# Patient Record
Sex: FEMALE | Race: WHITE | NOT HISPANIC OR LATINO
[De-identification: names, ages, dates, MRNs, and addresses within clinical notes are randomized per-mention and may not be internally consistent; named-entity substitution may affect disease eponyms.]

---

## 2022-11-17 ENCOUNTER — TRANSCRIBE ORDERS (OUTPATIENT)
Dept: OTHER | Age: 62
End: 2022-11-17

## 2022-11-17 DIAGNOSIS — L30.9 HAND DERMATITIS: Primary | ICD-10-CM

## 2023-03-23 NOTE — TELEPHONE ENCOUNTER
FUTURE VISIT INFORMATION      FUTURE VISIT INFORMATION:    Date: 6.13.23    Time: 12:15    Location: [facility]  REFERRAL INFORMATION:    Referring provider:  Liv    Referring providers clinic:  [facility]    Reason for visit/diagnosis  Hand dermatitis [L30.9] / recs in [facility] / ref by Alex Peck MD / dedrick PtVicky Denise    RECORDS REQUESTED FROM:       Clinic name Comments Records Status Imaging Status   [facility] 11.15.23  Liv ERNANDEZ

## 2023-06-13 ENCOUNTER — PRE VISIT (OUTPATIENT)
Dept: ALLERGY | Facility: CLINIC | Age: 63
End: 2023-06-13

## 2023-06-13 ENCOUNTER — OFFICE VISIT (OUTPATIENT)
Dept: ALLERGY | Facility: CLINIC | Age: 63
End: 2023-06-13
Attending: ALLERGY & IMMUNOLOGY
Payer: COMMERCIAL

## 2023-06-13 DIAGNOSIS — L23.89 ALLERGIC CONTACT DERMATITIS DUE TO OTHER AGENTS: ICD-10-CM

## 2023-06-13 DIAGNOSIS — L30.1 DYSHIDROTIC HAND DERMATITIS: ICD-10-CM

## 2023-06-13 DIAGNOSIS — J30.89 SEASONAL AND PERENNIAL ALLERGIC RHINOCONJUNCTIVITIS: ICD-10-CM

## 2023-06-13 DIAGNOSIS — B35.2 TINEA MANUUM: Primary | ICD-10-CM

## 2023-06-13 DIAGNOSIS — H10.10 SEASONAL AND PERENNIAL ALLERGIC RHINOCONJUNCTIVITIS: ICD-10-CM

## 2023-06-13 DIAGNOSIS — L20.89 OTHER ATOPIC DERMATITIS: ICD-10-CM

## 2023-06-13 DIAGNOSIS — L30.9 HAND DERMATITIS: ICD-10-CM

## 2023-06-13 DIAGNOSIS — J30.2 SEASONAL AND PERENNIAL ALLERGIC RHINOCONJUNCTIVITIS: ICD-10-CM

## 2023-06-13 PROCEDURE — 87101 SKIN FUNGI CULTURE: CPT | Performed by: DERMATOLOGY

## 2023-06-13 PROCEDURE — 99203 OFFICE O/P NEW LOW 30 MIN: CPT | Performed by: DERMATOLOGY

## 2023-06-13 RX ORDER — MULTIVIT WITH MINERALS/LUTEIN
1000 TABLET ORAL DAILY
COMMUNITY

## 2023-06-13 RX ORDER — ALBUTEROL SULFATE 90 UG/1
2 AEROSOL, METERED RESPIRATORY (INHALATION)
COMMUNITY
Start: 2021-08-26

## 2023-06-13 RX ORDER — CHLORAL HYDRATE 500 MG
2000 CAPSULE ORAL
COMMUNITY
Start: 2021-08-24

## 2023-06-13 RX ORDER — PIMECROLIMUS 10 MG/G
CREAM TOPICAL
COMMUNITY
Start: 2022-11-18

## 2023-06-13 RX ORDER — FLUTICASONE PROPIONATE 50 MCG
1 SPRAY, SUSPENSION (ML) NASAL PRN
COMMUNITY

## 2023-06-13 RX ORDER — LEVOTHYROXINE SODIUM 125 UG/1
125 TABLET ORAL
COMMUNITY
Start: 2022-12-07

## 2023-06-13 RX ORDER — MULTIPLE VITAMINS W/ MINERALS TAB 9MG-400MCG
1 TAB ORAL DAILY
COMMUNITY

## 2023-06-13 NOTE — PATIENT INSTRUCTIONS
_____________________________    PATCH TESTING    WHAT IS A PATCH TEST ?    A patch test is a way of identifying whether a substance has caused a delayed reaction with skin inflammation, such as contact eczema or delayed (after days) reactions to drugs. We will use various different types of test compounds, which may include common allergens in occupation and daily life such as  preservatives, fragrances or even drugs. Most of the time we will use standardized, prefabricated test solutions and the choice of the substances and series tested will depend on the history of the allergic reactions. Sometimes we will test your own products you are exposed at home or at work. In order to avoid severe toxic reactions we need detailed information s or safety sheets about each of these test compounds.    HOW IS A PATCH TEST PERFORMED ?    You will be given three appointments to complete this test. On the first appointment the nurse will apply 100-180 small test chambers each one of them containing a different allergen on your back and/or on your arms. We will use hypoallergenic and somehow waterproof adhesive tape. Afterwards the different sites will be marked with a waterproof marker. These patches must stay in place for 2 days. On the second appointment the patches will be removed and the allergy doctor/nurse will evaluate the skin reactions and maybe re-apply the marks. On the third appointment the allergy doctor will re-evaluate possible allergic reactions and discuss with you the nature of the allergens you react to and how to avoid them.    AVOID THE FOLLOWING:    DO NOT wash your back and other test areas during the entire test period (3-5 days), NO bathing or swimming  AVOID strenuous exercise with sweating  DO NOT scratch the test area  AVOID exposure to UV irradiation (sun, therapy)    Patch tests should be performed when the allergy is resolved  Remove patch tests only if severe reaction (itch, pain, blistering)  and report to your doctor immediately. Luis Fernando then the locations of each test field  If patch tests peel off, then try to fix them and record time and luis fernando test field  No oral steroids (e.g. Prednisone) 1 month prior to tests    WHAT ARE THE POSSIBLE RISKS OF PATCH TESTS ?    If you are allergic to a compound tested you will develop after a few days localized skin reactions similar to your previous allergic reaction. This includes formation of red, itchy skin lesions of few mm to cm with small vesicles and even blisters. The lesions will fade off over days and weeks and might sometimes leave for a few months some skin pigmentations. In rare cases a localized reaction to patch tests can become generalized. The tests with your own products might have some risks because they are not standardized and unanticipated reactions can occur. We need as much information as possible to evaluate if your own products are safe to test and under what conditions. This has to be evaluated for each individual product.        ? WHAT ARE THE PREPARATIONS NEEDED FOR THESE VARIOUS ALLERGY TESTS ?    Some medications can affect the reactions to allergens during the tests. Therefore reveal all the medications you take to the allergy team and the doctor will discuss with you the medications before/during the tests. Normally, you have to respect following rules (unless otherwise instructed by doctor):  For prick, intradermal and provocation tests stop antihistamines (e.g. loratadine (Claritin), cetirizine (Zyrtec), fexofenadine (Allegra) etc 1 week prior to the appointment   For patch tests and provocation tests, stop systemic corticosteroids 1 month and topical steroids 1 week prior to tests  Eat normally and take a shower before tests    _____________________________    SKIN PRICK TESTING    WHAT IS A SKIN PRICK TEST (SPT) ?    A skin prick test (SPT) is a simple and reliable diagnostic test used to identify substances ( allergens )  responsible for triggering the symptoms of allergy. The basic SPT panel includes common allergens, such as house dust mites, molds, dog and cat allergens and grass/tree pollen allergens. We have other more specialized series for various food allergens and sometimes we test your own food directly on your skin. Tests will be usually performed on the skin of the forearm (rarely on back). The skin may develop a red and itchy reaction which can be an indication of an allergy to to tested substance, but will be confirmed by discussion with the allergy specialist    HOW IS A SPT PERFORMED ?    The skin will be disinfected with 70% Isopropanol alcohol, then marked and numbered with a pen to identify the areas where the specific allergens will be tested. Afterwards a drop of the allergen solution will be placed on the skin and then the skin gently pricked using the tip of a specially designed prick needle. With this procedure the most superficial part of the skin will be pierced to allow the test solution to diffuse into the skin and make contact with the reactive immune cells. After 15-30min the area will be examined and evaluated for possible allergic reactions.        WHAT ARE THE POSSIBLE RISKS OF SPT ?    If the skin reacts it will develop red, itchy wheals of 5-30mm diameter. The wheal and itch will usually disappear spontaneously after 1-2 hours. Sometimes there might be a delayed reactions after days and this has to be reported to the treating allergy specialist (could be another kind of reaction pattern and important piece of information). Rarely there are more serious generalized allergic reactions observed and therefore you will be under observation of the allergy team during the entire procedure. There is a small risk for some bleeding and skin infection by the SPT.    _____________________________    INTRADERMAL TESTS      WHAT IS AN INTRADERMAL TEST (IDT) ?    An intradermal test (IDT) is basically a  continuation of the SPT and is sometimes proposed if the skin prick test is negative and the person is strongly suspected to have an allergy to a particular substance. IDT is particularly used for diagnosis of drug allergies and only sterile solutions will be tested by IDT. Because more allergen is delivered to the skin than in SPT the IDT can add more sensitivity to the allergy tests, but with a higher potential risk.     HOW IS AN INTRADERMAL TEST (IDT) PERFORMED ?    A small amount (~ 0.05ml) of the suspected allergen is injected with a very fine insulin needle just beneath the skin. The injections are made at spaced intervals after disinfection and marking of the skin areas. The tests are usually performed on the forearm (rarely back). The initial test will be started with a very diluted solution and if the results are negatives the procedure might be repeated with serial dilutions of higher concentrations. Therefore, the estimated duration of this test is about 45-60 min. Sometimes we observe delayed type reactions to the intradermal tests after 1-4 days and if this is the case take a photo and inform our staff and load the photo into [employer]. Best would be to take the photos with a ruler that we know at which position the positive test was.          WHAT ARE THE POSSIBLE RISKS OF IDT ?    If the skin reacts it will develop red, itchy wheals of 5-30mm diameter. The wheal and itch will usually disappear spontaneously after 1-2 hours. Sometimes there might be a delayed reactions after days and this has to be reported to the treating allergy specialist (could be another kind of reaction pattern and important piece of information). Rarely there are more serious generalized allergic reactions observed and therefore you will be under observation of the allergy team during the entire procedure. Only sterile solutions will be used for injections, but there is still a small risk for infections or unpredictable local  toxic reactions by the allergens. Some transient bleeding might occur.     _____________________________      ORAL PROVOCATION TEST      WHAT IS AN  ORAL PROVOCATION TEST (OPT) ?    An oral provocation test (OPT) is a procedure primarily used to exclude a specific allergy to a particular drug or food. These substances are then administered orally, rarely in case of drugs by subcutaneous or intravenous injections. This test is only conducted if the skin prick and intradermal and/or patch tests were negatives. A formal written consent will be taken prior to the provocation test.         HOW IS AN ORAL PROVOCATION TEST PERFORMED?    For oral (food/drugs) provocation tests you will have to ingest the food or drug hidden in a capsule or in its natural form. The test will usually be placebo-controlled and will include a capsule or other application form not containing the suspected allergen, but non-reactive Mannitol sugar. The various drugs/food will be given at specific time intervals under strict medical supervision.     Two to six serial doses containing the test food or drug will given at normally 30min time intervals, which might vary for each individual. You will be required to stay at the clinic at all times so that the allergy doctors and nurses can early recognize and treat immediately possible allergic reactions. After the last dose you have to stay during at least 1h for observation. The entire procedure will take about 2-6hours, depending on the number of incremental doses and the observation time.     WHAT ARE THE POSSIBLE RISKS OF ORAL PROVOCATION TEST ?    The provocation tests have the highest risk potential of all the tests and therefore close observation is mandatory. The entire procedure will be discussed prior to the test (except when the placebo will be given). The reactions can go from local allergic reactions to more severe generalized reactions. Therefore, we will not perform provocation tests  without prior evaluation by prick or intradermal tests and we plan to exclude an allergy by this test. If there is a higher risk, the test will be performed in a bed and with a secure intravenous access with infusion. The medication of a severe allergic reactions include high dose corticosteroids, antihistamines and if necessary epinephrine (Epi-Pen).    Useful Contact Information    Change of appointments or allergy-related [phone]  Email: [email or web address]  Location/address: [address] [place] [id number]    If you develop any serious or adverse allergic reaction after office hours please seek immediate medical assistance at the nearest clinic or emergency room.

## 2023-06-13 NOTE — PROGRESS NOTES
[facility] Dermato-allergology Note  Office visit  Encounter Date: Jun 13, 2023  ____________________________________________    CC: No chief complaint on file.      HPI:  (Jun 13, 2023)  Ms. Vicky Carlson is a(n) 62 year old female who presents today as new patient for allergy consultation  - Had in 2014 allergy testing with Dr. Peck and found pollen allergies then.   - since 2017 on back of hands first and then also palmar recurrent dermatitis left hand >> right hand  - one time had eczema below the left eye, otherwise other body areas not affected  >> Betamethasone ointment seems to make it worse  >> Elidel helped for some time  - otherwise feeling well in usual state of health    Physical exam:  General: In no acute distress, well-developed, well-nourished  Eyes: no conjunctivitis  ENT: no signs of rhinitis   Pulmonary: no wheezing or coughing  Skin: see above    Past Medical History:   There is no problem list on file for this patient.    No past medical history on file.    Allergies:  Not on File    Medications:  No current outpatient medications on file.     No current facility-administered medications for this visit.       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 Patient has the following hobbies or non-occupational exposure: golfing, paper crafts  Doesn't wear gloves really    Family History:  No family history on file.    Previous Labs, Allergy Tests, Dermatopathology, Imaging:  none    Referred By: Alex Peck MD  [address] [place] [address]     Allergy Tests:    Past Allergy Test    Order for Future Allergy Testing:    [] Outpatient  [] Inpatient: [facility]..../ Bed ....       Skin Atopy (atopic dermatitis) [] Yes   [x] No ..as child on feet dermatitis  Contact allergies:   [] Yes   [x] No ..........  Hand eczema:   [x] Yes   [] No recurrent dermatitis on back of hands and palmar left>right           Leading hand:   [x] R   [] L       []  Ambidextrous         Drug allergies:        [] Yes   [x] No  which?......    Urticaria/Angioedema  [] Yes   [] No .........  Food Allergy:  [x] Yes   [] No  Which?.if eats a lot of almonds get itchy in the mouth and soarness, shellfish like crab got stuffed in nose and mouth (shrimp/oyster no problems)   Pets :  [] Yes   [x] No  Which?..reacts to dogs and cats.         [x]  Rhinitis   [x] Conjunctivitis   [x] Sinusitis   [] Polyposis   [] Otitis   [] Pharyngitis         []  Postnasal drip    []  none  Operations:   [] Tonsils   [] Septum   [] Sinus   [] Polyposis        [] Asthma bronchiale   [] Coughing      [x]  none  Symptoms (mostly Rhinoconjunctivitis and Asthma) aggravated by:  Season   [] I   [] II   [x] III   [x] IV   []V   []VI   []VII   [x]VIII   [x]IX   []X   []XI   []XII     [] perennial   Day time      [] morning   [] noon      [] evening        [] night    [] whole day........  []  none  Location/changes    [] inside        [] outside   [] mountains    [] sea     [] others.............   []  none  Triggers, specific     [] animals     [] plants     [] dust              [] others ...........................    []  none  Triggers, others       [] work          [] psyche    [] sport            [] others .............................  []  none  Irritant                [] phys efforts [] smoke    [] heat/cold     [] odors  []others............... []  none    Order for PATCH TESTS  Reason for tests (suspected allergy): recurrent hand dermatitis  Known previous allergies: none  Standardized panels  [x] Standard panel (40 tests)  [x] Preservatives & Antimicrobials (31 tests)  [x] Emulsifiers & Additives (25 tests)   [x] Perfumes/Flavours & Plants (25 tests)  [x] Hairdresser panel (12 tests)  [] Rubber Chemicals (22 tests)  [x] Plastics (26 tests)  [] Colorants/Dyes/Food additives (20 tests)  [] Metals (implants/dental) (24 tests)  [] Local anaesthetics/NSAIDs (13 tests)  [x] Antibiotics & Antimycotics (14  tests)   [x] Corticosteroids (15 tests)   [] Photopatch test (62 tests)   [] others: ...      [x] Patient's own products: Betamethasone    DO NOT test if chemical or biological identity is unknown!     always ask from patient the product information and safety sheets (MSDS)       Order for PRICK TESTS    Reason for tests (suspected allergy): not necessary  Known previous allergies: seasonal RC tested with Dr. Peck    Standardized prick panels  [x] Atopic panel (20 tests) maybe only first 8 for pricks and if neg do IDT with delayed readings  [] Pediatric Panel (12 tests)  [] Milk, Meat, Eggs, Grains (20 tests)   [] Dust, Epithelia, Feathers (10 tests)  [x] Fish, Seafood, Shellfish (17 tests)  [] Nuts, Beans (14 tests)  [] Spice, Vegetable, Fruit (17 tests)  [] Pollen Panel = Tree, Grass, Weed (24 tests)  [] Others: ...      [] Patient's own products: ...    DO NOT test if chemical or biological identity is unknown!     always ask from patient the product information and safety sheets (MSDS)     Standardized intradermal tests  [] Penicillium notatum [] Aspergillus fumigatus [] House dust mites D.far & D. pteron  [] Cat    [] dog  [] Others: ...  [] Bee venom   [] Wasp venom  !!Specific protocol with dilutions!!       Order for Drug allergy tests (prick & Intradermal & patch tests)    [] Penicillin G  [] Ampicillin [] Cefazolin   [] Ceftriaxone   [] Ceftazidime  [x] Bactrim    [] Others: ...  Order for ... as test date    [] Patient needs consultation with Allergy team (changes of tests may apply)  [x] Tests discussed with Allergy team (can have direct appointment for allergy tests)     ________________________________    Assessment & Plan:    ==> Final Diagnosis:     # recurrent dermatitis on hand left>right  DDx atopic dermatitis with dyshidrosis and irritant dermatitis  DDx allergic contact dermatitis  DDx tinea hands  * chronic illness with exacerbation, progression, side effects from treatment    # atopic  predisposition with    Seasonal RC (proven by Dr. Peck)    Dyshidrotic hand dermatitis  * chronic illness with exacerbation, progression, side effects from treatment      These conclusions are made at the best of one's knowledge and belief based on the provided evidence such as patient's history and allergy test results and they can change over time or can be incomplete because of missing information's.    ==> Treatment Plan:  >> fungal culture today and if filamentous fungi, then treat orally with Lamisil  >> plan patch and partial prick tests for molds and dust mites and maybe IDT    Staff:  Provider    Follow-up in Derm-Allergy clinic     I spent a total of 35 minutes with Vicky Carlson. This time was spent counseling the patient and/or coordinating care, explaining the allergy tests

## 2023-06-13 NOTE — Clinical Note
6/13/2023         RE: Vicky Carlson  [address] [place] [address]        Dear Colleague,    Thank you for referring your patient, Vicky Carlson, to the [facility] [place]. Please see a copy of my visit note below.    Formerly [facility] Dermato-allergology Note  Office visit  Encounter Date: Jun 13, 2023  ____________________________________________    CC: No chief complaint on file.      HPI:  (Jun 13, 2023)  Ms. Vicky Carlson is a(n) 62 year old female who presents today as new patient for allergy consultation  - Had in 2014 allergy testing with Dr. Peck and found pollen allergies then.   - since 2017 on back of hands first and then also palmar recurrent dermatitis left hand >> right hand  - one time had eczema below the left eye, otherwise other body areas not affected  >> Betamethasone ointment seems to make it worse  >> Elidel helped for some time  - otherwise feeling well in usual state of health    Physical exam:  General: In no acute distress, well-developed, well-nourished  Eyes: no conjunctivitis  ENT: no signs of rhinitis   Pulmonary: no wheezing or coughing  Skin: Focused examination of the skin on test sites was performed = see test results below  {Skin Exam:941481}    Past Medical History:   There is no problem list on file for this patient.    No past medical history on file.    Allergies:  Not on File    Medications:  No current outpatient medications on file.     No current facility-administered medications for this visit.       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 Patient has the following hobbies or non-occupational exposure: golfing, paper crafts  Doesn't wear gloves really    Family History:  No family history on file.    Previous Labs, Allergy Tests, Dermatopathology, Imaging:  none    Referred By: Alex Peck MD  [address] [address]     Allergy Tests:    Past Allergy Test    Order  for Future Allergy Testing:    [] Outpatient  [] Inpatient: [facility]..../ Bed ....       Skin Atopy (atopic dermatitis) [] Yes   [x] No ..as child on feet dermatitis  Contact allergies:   [] Yes   [x] No ..........  Hand eczema:   [x] Yes   [] No recurrent dermatitis on back of hands and palmar left>right           Leading hand:   [x] R   [] L       [] Ambidextrous         Drug allergies:        [] Yes   [x] No  which?......    Urticaria/Angioedema  [] Yes   [] No .........  Food Allergy:  [x] Yes   [] No  Which?.if eats a lot of almonds get itchy in the mouth and soarness, shellfish like crab got stuffed in nose and mouth (shrimp/oyster no problems)   Pets :  [] Yes   [x] No  Which?..reacts to dogs and cats.         [x]  Rhinitis   [x] Conjunctivitis   [x] Sinusitis   [] Polyposis   [] Otitis   [] Pharyngitis         []  Postnasal drip    []  none  Operations:   [] Tonsils   [] Septum   [] Sinus   [] Polyposis        [] Asthma bronchiale   [] Coughing      [x]  none  Symptoms (mostly Rhinoconjunctivitis and Asthma) aggravated by:  Season   [] I   [] II   [x] III   [x] IV   []V   []VI   []VII   [x]VIII   [x]IX   []X   []XI   []XII     [] perennial   Day time      [] morning   [] noon      [] evening        [] night    [] whole day........  []  none  Location/changes    [] inside        [] outside   [] mountains    [] sea     [] others.............   []  none  Triggers, specific     [] animals     [] plants     [] dust              [] others ...........................    []  none  Triggers, others       [] work          [] psyche    [] sport            [] others .............................  []  none  Irritant                [] phys efforts [] smoke    [] heat/cold     [] odors  []others............... []  none    Order for PATCH TESTS  Reason for tests (suspected allergy): recurrent hand dermatitis  Known previous allergies: none  Standardized panels  [x] Standard panel (40 tests)  [x] Preservatives & Antimicrobials  (31 tests)  [x] Emulsifiers & Additives (25 tests)   [x] Perfumes/Flavours & Plants (25 tests)  [x] Hairdresser panel (12 tests)  [] Rubber Chemicals (22 tests)  [x] Plastics (26 tests)  [] Colorants/Dyes/Food additives (20 tests)  [] Metals (implants/dental) (24 tests)  [] Local anaesthetics/NSAIDs (13 tests)  [x] Antibiotics & Antimycotics (14 tests)   [x] Corticosteroids (15 tests)   [] Photopatch test (62 tests)   [] others: ...      [x] Patient's own products: Betamethasone    DO NOT test if chemical or biological identity is unknown!     always ask from patient the product information and safety sheets (MSDS)       Order for PRICK TESTS    Reason for tests (suspected allergy): not necessary  Known previous allergies: seasonal RC tested with Dr. Peck    Standardized prick panels  [x] Atopic panel (20 tests) maybe only first 8 for pricks and if neg do IDT with delayed readings  [] Pediatric Panel (12 tests)  [] Milk, Meat, Eggs, Grains (20 tests)   [] Dust, Epithelia, Feathers (10 tests)  [x] Fish, Seafood, Shellfish (17 tests)  [] Nuts, Beans (14 tests)  [] Spice, Vegetable, Fruit (17 tests)  [] Pollen Panel = Tree, Grass, Weed (24 tests)  [] Others: ...      [] Patient's own products: ...    DO NOT test if chemical or biological identity is unknown!     always ask from patient the product information and safety sheets (MSDS)     Standardized intradermal tests  [] Penicillium notatum [] Aspergillus fumigatus [] House dust mites D.far & D. pteron  [] Cat    [] dog  [] Others: ...  [] Bee venom   [] Wasp venom  !!Specific protocol with dilutions!!       Order for Drug allergy tests (prick & Intradermal & patch tests)    [] Penicillin G  [] Ampicillin [] Cefazolin   [] Ceftriaxone   [] Ceftazidime  [x] Bactrim    [] Others: ...  Order for ... as test date    [] Patient needs consultation with Allergy team (changes of tests may apply)  [x] Tests discussed with Allergy team (can have direct appointment for allergy  tests)     ________________________________    Assessment & Plan:    ==> Final Diagnosis:     # recurrent dermatitis on hand left>right  DDx atopic dermatitis with dyshidrosis and irritant dermatitis  DDx allergic contact dermatitis  DDx tinea hands  {jgstatus:293292}    # atopic predisposition with    Seasonal RC (proven by Dr. Peck)    Dyshidrotic hand dermatitis  {jgstatus:341286}    # ***  {jgstatus:734649}  {jgallergytestfinal:527109}  - ***    These conclusions are made at the best of one's knowledge and belief based on the provided evidence such as patient's history and allergy test results and they can change over time or can be incomplete because of missing information's.    ==> Treatment Plan:  >> fungal culture today and if filamentous fungi, then treat orally with Lamisil  >> plan patch and partial prick tests for molds and dust mites and maybe IDT    Staff:  Provider    Follow-up in Derm-Allergy clinic     I spent a total of 30 minutes with Vicky Carlson. This time was spent counseling the patient and/or coordinating care, explaining the allergy tests      Again, thank you for allowing me to participate in the care of your patient.        Sincerely,        Jeff Wild MD

## 2023-06-28 ENCOUNTER — TELEPHONE (OUTPATIENT)
Dept: ALLERGY | Facility: CLINIC | Age: 63
End: 2023-06-28
Payer: COMMERCIAL

## 2023-06-28 NOTE — TELEPHONE ENCOUNTER
[facility] Call Center    Phone Message    May a detailed message be left on voicemail: yes     Reason for Call: Other: Patient called to say she had xrays taken and a mass was found on her lung. She may need a PET scan and a procedure to go in to take a biopsy. She is unsure what type of meds she will be put on. Should she reschedule her patch test or should she wait to see if they put her on any steroids, etc? Please call back [phone] Thank you     Action Taken: Message routed to:  [facility] ([facility]): Allergy    Travel Screening: Not Applicable

## 2023-06-29 NOTE — CONFIDENTIAL NOTE
Called to discuss. Keeping patch testing as scheduled. Patient states she will update clinic if anything changes, has to take steroids etc.     Patient also asked about fungal culture results, will route to provider regarding results.

## 2023-07-11 LAB — BACTERIA SKIN AEROBE CULT: NO GROWTH

## 2023-07-13 ENCOUNTER — TELEPHONE (OUTPATIENT)
Dept: ALLERGY | Facility: CLINIC | Age: 63
End: 2023-07-13
Payer: COMMERCIAL

## 2023-07-13 DIAGNOSIS — Z88.9 DRUG ALLERGY: Primary | ICD-10-CM

## 2023-07-13 NOTE — TELEPHONE ENCOUNTER
Standardized panels  [x]? Standard panel (40 tests)  [x]? Preservatives & Antimicrobials (31 tests)  [x]? Emulsifiers & Additives (25 tests)   [x]? Perfumes/Flavours & Plants (25 tests)  [x]? Hairdresser panel (12 tests)  []? Rubber Chemicals (22 tests)  [x]? Plastics (26 tests)  []? Colorants/Dyes/Food additives (20 tests)  []? Metals (implants/dental) (24 tests)  []? Local anaesthetics/NSAIDs (13 tests)  [x]? Antibiotics & Antimycotics (14 tests)   [x]? Corticosteroids (15 tests)   []? Photopatch test (62 tests)   []? others: ...                         [x]? Patient's own products: Betamethasone    STANDARD Series                                          # Substance 2 days 4 days remarks     1 Cesar Mix [C] - -       2 Colophony - -       3  2-Mercaptobenzothiazole  - -       4 Methylisothiazolinone - -       5 Carba Mix - -       6 Thiuram Mix [A] - -       7 Bisphenol A Epoxy Resin - -       8 V-Cfjg-[place]-Formaldehyde Resin - -       9 Mercapto Mix [A] - -       10 Black Rubber Mix- PPD [B] - -       11 Potassium Dichromate  -  -       12 Balsam of [place] (Myroxylon Pereirae Resin) - -       13 Nickel Sulphate Hexahydrate - -       14 Mixed Dialkyl Thiourea - -       15 Paraben Mix [B] - -       16 Methyldibromo Glutaronitrile - -       17 Fragrance Mix 8% - -       18 2-Bromo-2-Nitropropane-1,3-Diol (Bronopol) CT - -       19 Lyral - -       20 Tixocortol-21- Pivalate CT - -       21 Diazolidinyl urea (Germall II) - -        22 Methyl Methacrylate - -       23 Cobalt (II) Chloride Hexahydrate - -       24 Fragrance Mix II  - -       25 Compositae Mix - -       26 Benzoyl Peroxide - -       27 Bacitracin - -       28 Formaldehyde - -       29 Methylchloroisothiazolinone / Methylisothiazolinone - -       30 Corticosteroid Mix CT - -       31 Sodium Lauryl Sulfate - -       32 Lanolin Alcohol - -       33 Turpentine - -       34 Cetylstearylalcohol - -       35 Chlorhexidine Dicluconate - -       36  Budesonide - -       37 Imidazolidinyl Urea  - -       38 Ethyl-2 Cyanoacrylate - -       39 Quaternium 15 (Dowicil 200) - -       40 Decyl Glucoside - -       PRESERVATIVES & ANTIMICROBIALS        # Substance 2 days 4 days remarks   41 1  1,2-Benzisothiazoline-3-One, Sodium Salt - -     2  1,3,5-Ryder (2-Hydroxyethyl) - Hexahydrotriazine (Grotan BK) - -     3 1-Nqvfqoeprrymc-1-Nitro-1, 3-Propanediol - -     4  3, 4, 4' - Triclocarban - -    45 5 4 - Chloro - 3 - Cresol - -     6 4 - Chloro - 4 - Xylenol (PCMX) - -     7 7-Ethylbicyclooxazolidine (Bioban CK5717) - -     8 Benzalkonium Chloride CT - -     9 Benzyl Alcohol - -    50 10 Cetalkonium Chloride - -     11 Cetylpyrimidine Chloride  - -     12 Chloroacetamide - -     13 DMDM Hydantoin - -     14 Glutaraldehyde - -    55 15 Triclosan - -     16 Glyoxal Trimeric Dihydrate - -     17 Iodopropynyl Butylcarbamate - -     18 Octylisothiazoline - -     19 Bithionol CT - -    60 20 Bioban P 1487 (Nitrobutyl) Morpholine/(Ethylnitro-Trimethylene) Dimorpholine - -     21 Phenoxyethanol - -     22 Phenyl Salicylate - -     23 Povidone Iodine - -     24 Sodium Benzoate - -    65 25 Sodium Disulfite - -     26 Sorbic Acid - -     27 Thimerosal - -     28 Melamine Formaldehyde Resin - -     29 Ethylenediamine Dihydrochloride - -      Parabens      70 30 Butyl-P-Hydroxybenzoate - -     31 Ethyl-P-Hydroxybenzoate - -     32 Methyl-P-Hydroxybenzoate - -    73 33 Propyl-P-Hydroxybenzoate - -      EMULSIFIERS & ADDITIVES       # Substance 2 days 4 days remarks   74 1 Polyethylene Glycol-400 - -    75 2 Cocamidopropyl Betaine - -     3 Amerchol L101 - -     4 Propylene Glycol - -     5 Triethanolamine - -     6 Sorbitane Sesquiolate CT - -    80 7 Isopropylmyristate - -     8 Polysorbate 80 CT - -     9 Amidoamine   (Stearamidopropyl Dimethylamine) - -     10 Oleamidopropyl Dimethylamine - -     11 Lauryl Glucoside - -    85 12 Coconut Diethanolamide  - -     13  2-Hydroxy-4-Methoxy Benzophenone (Oxybenzone) - -     14 Benzophenone-4 (Sulisobenzon) - -     15 Propolis - -     16 Dexpanthenol - -    90 17 Abitol - -     18 Tert-Butylhydroquinone - -     19 Benzyl Salicylate - -     20 Dimethylaminopropylamin (DMPA) - -     21 Zinc Pyrithione (Zinc Omadine)  - -    95 22 Ryder(Hydroxymethyl) Nitromethane  - -      Antioxidant       23 Dodecyl Gallate - -     24 Butylhydroxyanisole (BHA) - -     25 Butylhydroxytoluene (BHT) - -     26 Di-Alpha-Tocopherol (Vit E) - -    100 27 Propyl Gallate - -      PERFUMES, FLAVORS & PLANTS        # Substance 2 days 4 days remarks   101 1 Benzyl Cinnamate - -     2 Di-Limonene (Dipentene) - -     3 Cananga Odorata (Raffaele Castorena) (I) - -     4 Lichen Acid Mix - -    105 5 Mentha Piperita Oil (Peppermint Oil) - -     6 Sesquiterpenelactone mix - -     7 Tea Tree Oil, Oxidized - -     8 Wood Tar Mix - -     9 Abietic Acid - -    110 10 Lavendula Angustifolia Oil (Lavender Oil) - -     11 Fragrance mix II CT * 14% - -      Fragrance Mix I       12 [place] Absolute - -     13 Eugenol - -     14 Geraniol - -    115 15 Hydroxycitronellal - -     16 Isoeugenol - -     17 Cinnamic Aldehyde - -     18 Cinnamic Alcohol  - -      Fragrance mix II       19 Citronellol - -    120 20 Alpha-Hexylcinnamic Aldehyde    - -     21 Citral - -     22 Farnesol - -    123 23 Coumarin - -      Hexylcinnamic aldehyde, Coumarin, Farnesol, Hydroxyisohexy3-cyclohexene carboxaldehyde, citral, citrolellol  HAIRDRESSER        # Substance 2 days 4 days remarks   124 1 P-Phenylenediamine -  -    125 2 P-Toluenediamine Sulphate  -  -     3 Ammonium Thioglycolate - -     4 Ammonium Persulfate - -     5 Resorcinol - -     6 3-Aminophenol - -    130 7 P-Aminophenol - -     8 Glyceryl Monothioglycolate - -    132 9 Pyrogallol - -      PLASTICS (Acrylates/Epoxy Systems)       # Substance 2 days 4 days remarks     Acrylates - -    133 1 2-Hydroxyethyl Methacrylate (HEMA) - -    134 2  1,4-Butandioldimethacrylate (BUDMA) - -     3  2-Ethylhexyl Acrylate - -     4 Bisphenol-A-Dimethacrylate  - -     5 Diurethane-Dimethacrylate - -     6 Ethyleneglycoldimethacrylate (EGDMA) - -     7 Pentaerythritoltriacrylate (AYE) - -    140 8 Triethylene Glycol Dimethacrylate (TEGDMA) - -      Synthetic material/additives        9 E-Esww-Hybfehgkpqg - -     10 Tricresyl Phosphate - -     11 0-Oopf-Syblsjfzmuemd - -     12 Dioctyl phtalate(DEHP,DOP) / (Dimethylhexylphthalate)  - -    145 13 Dibutylphthalate - -     14 Dimethylphthalate - -     15 Toluene-2,4-Diisocyanate - -    NA 16 Diphenylmethane-4,4''-Diisocyanate - -      EPOXY RESIN SYSTEMS        Reactive Solvents - -    NA 17 Cresyl Glycidyl Ether - -    150 18 Butyl Glycidyl Ether - -     19 Phenyl Glycidyl Ether - -     20 1,4-Butanediol Diglycidyl Ether - -     21 1,6-Hexanediole Diglycidyl Ether - -      Hardener / Accelerator - -     22 Triethylenetetramine - -    155 23 Diethylenetriamine - -     24 Isophorone Diamine (IPD) - -     25 N,N-Dimethyl-P-Toluidine - -    158 26 Isobornyl Acrylate - -      ANTIBIOTICS & ANTIMYCOTICS    # Substance 2 days 4 days remarks   159 1 Erythromycin - -    160 2 Framycetin Sulfate - -     3 Fusidic Acid Sodium Salt - -     4 Gentamicin Sulfate - -     5 Neomycin Sulfate - -     6 Oxytetracycline  - -    165 7 Polymyxin B Sulfate - -     8 Tetracycline-HCL - -     9 Sulfanilamide - -     10 Metronidazole - -     11 Nitrofurazone - -    170 12 Nystatin - -     13 Clotrimazole - -     14 Clioquinol 5% - -     15 Miconazole  - -    174 16 Tobramycine           CORTICOSTEROIDS   # Substance 2 days 4 days remarks Allergy  class   175 1 Amcinonide - -  B    2 Betametasone-17,21 Dipropionate - -  D1    3 Desoximetasone - -  C    4 Betamethasone-17-Valerate - -  D1    5 Dexamethasone - -  C   180 6 Hydrocortisone - -  A    7 Clobetasol-17-Propionate - -  D1    8 Dexamethasone-21-Phosphate Disodium Salt - -  C    9  "Hydrocortisone-17 Butyrate - -  D2    10 Prednisolone - -  A   185 11 Triamcinolone Acetonide - -  B    12 Methylprednisolone Aceponate - -  D2    13 Hydrocortisone-21-Acetate - -  A   188 14 Prednicarbate - -  D2     Group Characteristics of group Generic name Name  cross reactions   A Hydrocortisone   Cloprednole, Fludrocortisone acétate, Hydrocortison acetate, Methylprednisolone, Prednisolone, Tixocortolpivalate Alfacortone, Fucidin H, Dermacalm, Hexacortone, Premandole, Imacort With group D2   B Triamcinolone-acetonide   Budenoside (R-isomer), Amcinonide, Desonide, Fluocinolone acetonide, Triamcinolone acetonide Locapred, Locatop  Synalar, Pevisone, Kenacort -   C Betamethasone (Without Nieves)   Betamethasone, Dexamethasone, Flumethasone pivalate, Halomethasone Daivobet, Dexasalyl, Locasalen,   -   D1 Betamethasone-diproprionate   Betamethasone dipropionate, Betamethasone-17-valerate, Clobetasole-propionate, Fluticasone propionate, Mometasone furoate Betnovate, Diprogenta, Diprosalic, Diprosone, Celestoderm, Fucicort,  Cutivate, Axotide, Elocom -   D2 Methylprednisolone-aceponate   Hydrocortisone-aceponate, Hydrocortisone-buteprate, Hydrocortisone-17-butyrate, Methylprednisolone aceponate, Prednicarbate Locoïd, Advantan,  Prednitop With group A and Budesonide (S-isomer)       OTHER PRODUCTS        # Substance Conc  % solv 2 days 4 days remarks   189 1 betamethasone         2          3          4          5          6          7          8          9          10           Patients own products:  è DO NOT test if chemical or biological identity is unknown!   - always ask from patient the product information and safety sheets and consult with the physician   - check neutral pH with pH indicator paper (do not test pH below 5 or over 8 or consult with physician)  - leave-on cosmetics can be tested \"as is\"  - rinse-off products have to be diluted with water, buffer, olive oil or paraffin (discuss with physician)  à " remember:   - non-specific toxic/corrosive or biological reactions can occur  - tests with patients own products are not standardized and test conditions are not optimized for patch tests. Whenever possible test with standardized test series and correlate use of product with the result of the patch tests  - if not sure if compounds can be tested under occlusion in patch tests consider open application tests      Results of patch tests:                         Interpretation:  - Negative                    A    = Allergic      (+) Erythema    TI   = Toxic/irritant   + E + Infiltration    RaP = Relevance at Present     ++ E/I + Papulovesicle   Rpr  = Relevance Previously     +++ E/I/P + Blister     nR   = No Relevance

## 2023-07-14 NOTE — TELEPHONE ENCOUNTER
Spoke to pt. Clarified that she can continue on nasal sprays, including flonase. Just no oral antihistamines such as benadryl because the plan is to prick test on Monday as well as patch testing. After prick testing is complete, oral antihistamines can be taken again.

## 2023-07-14 NOTE — TELEPHONE ENCOUNTER
patient has  drainage and coughing and phlegm - pet scan found mass on lung.  Patient starts patch test on Monday  Can Patient still use abuterol inhaler if needed?  What can patient take instead of Benadryl for cough especially at night?   Please call back [phone]

## 2023-07-15 NOTE — PROGRESS NOTES
[facility] Dermato-allergology Note  Office visit  Encounter Date: Jul 17, 2023  ____________________________________________    CC: No chief complaint on file.      HPI:  (Jul 17, 2023)  Ms. Vicky Carlson is a(n) 62 year old female who presents today as a return patient for allergy tests as planned  - Follow-up in Derm-Allergy clinic for allergy tests as planned  - Patient has about 7cm mass in the lung and it seems to shrink ==> maybe infectious  - otherwise feeling well in usual state of health    Physical exam:  General: In no acute distress, well-developed, well-nourished  Eyes: no conjunctivitis  ENT: no signs of rhinitis   Pulmonary: no wheezing or coughing  Skin: Focused examination of the skin on test sites was performed = see test results below  No active eczematous skin lesions on tests sites, particularly back    Earlier History and Allergy exams:  (Jun 13, 2023)  - Had in 2014 allergy testing with Dr. Peck and found pollen allergies then.   - since 2017 on back of hands first and then also palmar recurrent dermatitis left hand >> right hand  - one time had eczema below the left eye, otherwise other body areas not affected  >> Betamethasone ointment seems to make it worse  >> Elidel helped for some time      Past Medical History:   There is no problem list on file for this patient.    No past medical history on file.    Allergies:  Allergies   Allergen Reactions     Cats Other (See Comments)     Throat gets itchy/swelling      Seasonal Allergies Other (See Comments)     Matted swollen eyes.      Shellfish Allergy Other (See Comments) and Itching     Raw/fresh shrimp-oysters have caused eye swelling and throat itching.  Inconsistent.     Sulfa Antibiotics Unknown     Omeprazole Rash       Medications:  Current Outpatient Medications   Medication     albuterol (VENTOLIN HFA) 108 (90 Base) MCG/ACT inhaler     cholecalciferol 125 MCG (5000 UT) CAPS     Cyanocobalamin 5000 MCG TBDP      fish oil-omega-3 fatty acids (OMEGA-3 FISH OIL) 1000 MG capsule     fluticasone (FLONASE) 50 MCG/ACT nasal spray     levothyroxine (SYNTHROID/LEVOTHROID) 125 MCG tablet     multivitamin w/minerals (THERA-VIT-M) tablet     pimecrolimus (ELIDEL) 1 % external cream     sodium chloride (OCEAN) 0.65 % nasal spray     vitamin C (ASCORBIC ACID) 1000 MG TABS     Current Facility-Administered Medications   Medication     [START ON 7/17/2023] sulfamethoxazole-trimethoprim (BACTRIM) 80 mg for allergy testing       Social History:  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 Patient has the following hobbies or non-occupational exposure: golfing, paper crafts  Doesn't wear gloves really    Family History:  No family history on file.    Previous Labs, Allergy Tests, Dermatopathology, Imaging:  none    Referred By: Alex Peck MD  [address] [place] [address]     Allergy Tests:    Past Allergy Test    Order for Future Allergy Testing:    [] Outpatient  [] Inpatient: [facility]..../ Bed ....       Skin Atopy (atopic dermatitis) [] Yes   [x] No ..as child on feet dermatitis  Contact allergies:   [] Yes   [x] No ..........  Hand eczema:   [x] Yes   [] No recurrent dermatitis on back of hands and palmar left>right           Leading hand:   [x] R   [] L       [] Ambidextrous         Drug allergies:        [] Yes   [x] No  which?......    Urticaria/Angioedema  [] Yes   [] No .........  Food Allergy:  [x] Yes   [] No  Which?.if eats a lot of almonds get itchy in the mouth and soarness, shellfish like crab got stuffed in nose and mouth (shrimp/oyster no problems)   Pets :  [] Yes   [x] No  Which?..reacts to dogs and cats.         [x]  Rhinitis   [x] Conjunctivitis   [x] Sinusitis   [] Polyposis   [] Otitis   [] Pharyngitis         []  Postnasal drip    []  none  Operations:   [] Tonsils   [] Septum   [] Sinus   [] Polyposis        [] Asthma bronchiale   [] Coughing      [x]  none  Symptoms (mostly  Rhinoconjunctivitis and Asthma) aggravated by:  Season   [] I   [] II   [x] III   [x] IV   []V   []VI   []VII   [x]VIII   [x]IX   []X   []XI   []XII     [] perennial   Day time      [] morning   [] noon      [] evening        [] night    [] whole day........  []  none  Location/changes    [] inside        [] outside   [] mountains    [] sea     [] others.............   []  none  Triggers, specific     [] animals     [] plants     [] dust              [] others ...........................    []  none  Triggers, others       [] work          [] psyche    [] sport            [] others .............................  []  none  Irritant                [] phys efforts [] smoke    [] heat/cold     [] odors  []others............... []  none    Order for PATCH TESTS  Reason for tests (suspected allergy): recurrent hand dermatitis  Known previous allergies: none  Standardized panels  [x] Standard panel (40 tests)  [x] Preservatives & Antimicrobials (31 tests)  [x] Emulsifiers & Additives (25 tests)   [x] Perfumes/Flavours & Plants (25 tests)  [x] Hairdresser panel (12 tests)  [] Rubber Chemicals (22 tests)  [x] Plastics (26 tests)  [] Colorants/Dyes/Food additives (20 tests)  [] Metals (implants/dental) (24 tests)  [] Local anaesthetics/NSAIDs (13 tests)  [x] Antibiotics & Antimycotics (14 tests)   [x] Corticosteroids (15 tests)   [] Photopatch test (62 tests)   [] others: ...      [x] Patient's own products: Betamethasone    DO NOT test if chemical or biological identity is unknown!     always ask from patient the product information and safety sheets (MSDS)       Order for PRICK TESTS    Reason for tests (suspected allergy): not necessary  Known previous allergies: seasonal RC tested with Dr. Peck    Standardized prick panels  [x] Atopic panel (20 tests) maybe only first 8 for pricks and if neg do IDT with delayed readings  [] Pediatric Panel (12 tests)  [] Milk, Meat, Eggs, Grains (20 tests)   [] Dust, Epithelia, Feathers  (10 tests)  [x] Fish, Seafood, Shellfish (17 tests)  [] Nuts, Beans (14 tests)  [] Spice, Vegetable, Fruit (17 tests)  [] Pollen Panel = Tree, Grass, Weed (24 tests)  [] Others: ...      [] Patient's own products: ...    DO NOT test if chemical or biological identity is unknown!     always ask from patient the product information and safety sheets (MSDS)     Standardized intradermal tests  [] Penicillium notatum [] Aspergillus fumigatus [] House dust mites D.far & D. pteron  [] Cat    [] dog  [] Others: ...  [] Bee venom   [] Wasp venom  !!Specific protocol with dilutions!!       Order for Drug allergy tests (prick & Intradermal & patch tests)    [] Penicillin G  [] Ampicillin [] Cefazolin   [] Ceftriaxone   [] Ceftazidime  [x] Bactrim    [] Others: ...  Order for ... as test date    ________________________________    RESULTS & EVALUATION of PATCH TESTS    Jul 17, 2023 application of patch tests:    Patch test readings after     [x] 2 days, [] 3 days [x] 4 days, [] 5 days,  Other duration: ...      STANDARD Series                                          # Substance 2 days 4 days remarks     1 Cesar Mix [C] - -       2 Colophony - -       3  2-Mercaptobenzothiazole  - -       4 Methylisothiazolinone - -       5 Carba Mix - -       6 Thiuram Mix [A] - -       7 Bisphenol A Epoxy Resin - -       8 O-Cwnu-Pnzvuqvvfut-Formaldehyde Resin - -       9 Mercapto Mix [A] - -       10 Black Rubber Mix- PPD [B] - -       11 Potassium Dichromate  -  -       12 Balsam of [place] (Myroxylon Pereirae Resin) - -       13 Nickel Sulphate Hexahydrate - -       14 Mixed Dialkyl Thiourea - -       15 Paraben Mix [B] - -       16 Methyldibromo Glutaronitrile - -       17 Fragrance Mix 8% - -       18 2-Bromo-2-Nitropropane-1,3-Diol (Bronopol) CT - -       19 Lyral - -       20 Tixocortol-21- Pivalate CT - -       21 Diazolidinyl urea (Germall II) - -        22 Methyl Methacrylate - -       23 Cobalt (II) Chloride Hexahydrate - -       24  Fragrance Mix II  - -       25 Compositae Mix - -       26 Benzoyl Peroxide - -       27 Bacitracin - -       28 Formaldehyde - -       29 Methylchloroisothiazolinone / Methylisothiazolinone - -       30 Corticosteroid Mix CT - -       31 Sodium Lauryl Sulfate - -       32 Lanolin Alcohol - -       33 Turpentine - -       34 Cetylstearylalcohol - -       35 Chlorhexidine Dicluconate - -       36 Budesonide - -       37 Imidazolidinyl Urea  - -       38 Ethyl-2 Cyanoacrylate - -       39 Quaternium 15 (Dowicil 200) - -       40 Decyl Glucoside - -       PRESERVATIVES & ANTIMICROBIALS        # Substance 2 days 4 days remarks   41 1  1,2-Benzisothiazoline-3-One, Sodium Salt - -     2  1,3,5-Ryder (2-Hydroxyethyl) - Hexahydrotriazine (Grotan BK) - -     3 0-Wxvssscxwrqbk-0-Nitro-1, 3-Propanediol - -     4  3, 4, 4' - Triclocarban - -    45 5 4 - Chloro - 3 - Cresol - -     6 4 - Chloro - 4 - Xylenol (PCMX) - -     7 7-Ethylbicyclooxazolidine (Bioban US1094) - -     8 Benzalkonium Chloride CT - -     9 Benzyl Alcohol - -    50 10 Cetalkonium Chloride - -     11 Cetylpyrimidine Chloride  - -     12 Chloroacetamide - -     13 DMDM Hydantoin - -     14 Glutaraldehyde - -    55 15 Triclosan - -     16 Glyoxal Trimeric Dihydrate - -     17 Iodopropynyl Butylcarbamate - -     18 Octylisothiazoline - -     19 Bithionol CT - -    60 20 Bioban P 1487 (Nitrobutyl) Morpholine/(Ethylnitro-Trimethylene) Dimorpholine - -     21 Phenoxyethanol - -     22 Phenyl Salicylate - -     23 Povidone Iodine - -     24 Sodium Benzoate - -    65 25 Sodium Disulfite - -     26 Sorbic Acid - -     27 Thimerosal - -     28 Melamine Formaldehyde Resin - -     29 Ethylenediamine Dihydrochloride - -      Parabens      70 30 Butyl-P-Hydroxybenzoate - -     31 Ethyl-P-Hydroxybenzoate - -     32 Methyl-P-Hydroxybenzoate - -    73 33 Propyl-P-Hydroxybenzoate - -      EMULSIFIERS & ADDITIVES       # Substance 2 days 4 days remarks   74 1 Polyethylene  Glycol-400 - -    75 2 Cocamidopropyl Betaine - -     3 Amerchol L101 - -     4 Propylene Glycol - -     5 Triethanolamine - -     6 Sorbitane Sesquiolate CT - -    80 7 Isopropylmyristate - -     8 Polysorbate 80 CT - -     9 Amidoamine   (Stearamidopropyl Dimethylamine) - -     10 Oleamidopropyl Dimethylamine - -     11 Lauryl Glucoside - -    85 12 Coconut Diethanolamide  - -     13 2-Hydroxy-4-Methoxy Benzophenone (Oxybenzone) - -     14 Benzophenone-4 (Sulisobenzon) - -     15 Propolis - -     16 Dexpanthenol - -    90 17 Abitol - -     18 Tert-Butylhydroquinone - -     19 Benzyl Salicylate - -     20 Dimethylaminopropylamin (DMPA) - -     21 Zinc Pyrithione (Zinc Omadine)  - -    95 22 Ryder(Hydroxymethyl) Nitromethane  - -      Antioxidant       23 Dodecyl Gallate - -     24 Butylhydroxyanisole (BHA) - -     25 Butylhydroxytoluene (BHT) - -     26 Di-Alpha-Tocopherol (Vit E) - -    100 27 Propyl Gallate - -      PERFUMES, FLAVORS & PLANTS        # Substance 2 days 4 days remarks   101 1 Benzyl Cinnamate - -     2 Di-Limonene (Dipentene) - -     3 Cananga Odorata (Raffaele Castorena) (I) - -     4 Lichen Acid Mix - -    105 5 Mentha Piperita Oil (Peppermint Oil) - -     6 Sesquiterpenelactone mix - -     7 Tea Tree Oil, Oxidized - -     8 Wood Tar Mix - -     9 Abietic Acid - -    110 10 Lavendula Angustifolia Oil (Lavender Oil) - -     11 Fragrance mix II CT * 14% - -      Fragrance Mix I       12 Oakmoss Absolute - -     13 Eugenol - -     14 Geraniol - -    115 15 Hydroxycitronellal - -     16 Isoeugenol - -     17 Cinnamic Aldehyde - -     18 Cinnamic Alcohol  - -      Fragrance mix II       19 Citronellol - -    120 20 Alpha-Hexylcinnamic Aldehyde    - -     21 Citral - -     22 Farnesol - -    123 23 Coumarin - -      Hexylcinnamic aldehyde, Coumarin, Farnesol, Hydroxyisohexy3-cyclohexene carboxaldehyde, citral, citrolellol  HAIRDRESSER        # Substance 2 days 4 days remarks   124 1 P-Phenylenediamine -  -     125 2 P-Toluenediamine Sulphate  -  -     3 Ammonium Thioglycolate - -     4 Ammonium Persulfate - -     5 Resorcinol - -     6 3-Aminophenol - -    130 7 P-Aminophenol - -     8 Glyceryl Monothioglycolate - -    132 9 Pyrogallol - -      PLASTICS (Acrylates/Epoxy Systems)       # Substance 2 days 4 days remarks     Acrylates - -    133 1 2-Hydroxyethyl Methacrylate (HEMA) - -    134 2 1,4-Butandioldimethacrylate (BUDMA) - -     3  2-Ethylhexyl Acrylate - -     4 Bisphenol-A-Dimethacrylate  - -     5 Diurethane-Dimethacrylate - -     6 Ethyleneglycoldimethacrylate (EGDMA) - -     7 Pentaerythritoltriacrylate (AYE) - -    140 8 Triethylene Glycol Dimethacrylate (TEGDMA) - -      Synthetic material/additives        9 S-Gsic-Bnowibemdge - -     10 Tricresyl Phosphate - -     11 0-Djhq-Oxmruaxihlimt - -     12 Dioctyl phtalate(DEHP,DOP) / (Dimethylhexylphthalate)  - -    145 13 Dibutylphthalate - -     14 Dimethylphthalate - -     15 Toluene-2,4-Diisocyanate - -     16 Diphenylmethane-4,4''-Diisocyanate - -      EPOXY RESIN SYSTEMS        Reactive Solvents - -     17 Cresyl Glycidyl Ether - -    150 18 Butyl Glycidyl Ether - -     19 Phenyl Glycidyl Ether - -     20 1,4-Butanediol Diglycidyl Ether - -     21 1,6-Hexanediole Diglycidyl Ether - -      Hardener / Accelerator - -     22 Triethylenetetramine - -    155 23 Diethylenetriamine - -     24 Isophorone Diamine (IPD) - -     25 N,N-Dimethyl-P-Toluidine - -    158 26 Isobornyl Acrylate - -      ANTIBIOTICS & ANTIMYCOTICS    # Substance 2 days 4 days remarks   159 1 Erythromycin - -    160 2 Framycetin Sulfate - -     3 Fusidic Acid Sodium Salt - -     4 Gentamicin Sulfate - -     5 Neomycin Sulfate - -     6 Oxytetracycline  - -    165 7 Polymyxin B Sulfate - -     8 Tetracycline-HCL - -     9 Sulfanilamide - -     10 Metronidazole - -     11 Nitrofurazone - -    170 12 Nystatin - -     13 Clotrimazole - -     14 Clioquinol 5% - -     15 Miconazole  - -    174 16  Tobramycine           CORTICOSTEROIDS   # Substance 2 days 4 days remarks Allergy  class   175 1 Amcinonide - -  B    2 Betametasone-17,21 Dipropionate - -  D1    3 Desoximetasone - -  C    4 Betamethasone-17-Valerate - -  D1    5 Dexamethasone - -  C   180 6 Hydrocortisone - -  A    7 Clobetasol-17-Propionate - -  D1    8 Dexamethasone-21-Phosphate Disodium Salt - -  C    9 Hydrocortisone-17 Butyrate - -  D2    10 Prednisolone - -  A   185 11 Triamcinolone Acetonide - -  B    12 Methylprednisolone Aceponate - -  D2    13 Hydrocortisone-21-Acetate - -  A   188 14 Prednicarbate - -  D2     Group Characteristics of group Generic name Name  cross reactions   A Hydrocortisone   Cloprednole, Fludrocortisone acétate, Hydrocortison acetate, Methylprednisolone, Prednisolone, Tixocortolpivalate Alfacortone, Fucidin H, Dermacalm, Hexacortone, Premandole, Imacort With group D2   B Triamcinolone-acetonide   Budenoside (R-isomer), Amcinonide, Desonide, Fluocinolone acetonide, Triamcinolone acetonide Locapred, Locatop  Synalar, Pevisone, Kenacort -   C Betamethasone (Without Nieves)   Betamethasone, Dexamethasone, Flumethasone pivalate, Halomethasone Daivobet, Dexasalyl, Locasalen,   -   D1 Betamethasone-diproprionate   Betamethasone dipropionate, Betamethasone-17-valerate, Clobetasole-propionate, Fluticasone propionate, Mometasone furoate Betnovate, Diprogenta, Diprosalic, Diprosone, Celestoderm, Fucicort,  Cutivate, Axotide, Elocom -   D2 Methylprednisolone-aceponate   Hydrocortisone-aceponate, Hydrocortisone-buteprate, Hydrocortisone-17-butyrate, Methylprednisolone aceponate, Prednicarbate Locoïd, Advantan,  Prednitop With group A and Budesonide (S-isomer)       OTHER PRODUCTS        # Substance Conc  % solv 2 days 4 days remarks   189 1 betamethasone As is       190 2 Levothyroxin As is       191 3 Levothyroxin 50 vas        Patients own products:  è DO NOT test if chemical or biological identity is unknown!   - always ask  "from patient the product information and safety sheets and consult with the physician   - check neutral pH with pH indicator paper (do not test pH below 5 or over 8 or consult with physician)  - leave-on cosmetics can be tested \"as is\"  - rinse-off products have to be diluted with water, buffer, olive oil or paraffin (discuss with physician)  à remember:   - non-specific toxic/corrosive or biological reactions can occur  - tests with patients own products are not standardized and test conditions are not optimized for patch tests. Whenever possible test with standardized test series and correlate use of product with the result of the patch tests  - if not sure if compounds can be tested under occlusion in patch tests consider open application tests      Results of patch tests:                         Interpretation:  - Negative                    A    = Allergic      (+) Erythema    TI   = Toxic/irritant   + E + Infiltration    RaP = Relevance at Present     ++ E/I + Papulovesicle   Rpr  = Relevance Previously     +++ E/I/P + Blister     nR   = No Relevance      Atopy Screen (Placed Jul 17, 2023)  No Substance Readings (15 min) Evaluation   POS Histamine 1mg/ml -    NEG NaCl 0.9% -      No Substance Readings (15 min) Evaluation   1 Alternaria alternata (tenuis)  -    2 Cladosporium herbarum -    3 Aspergillus fumigatus -    4 Penicillium notatum -    5 Dermatophagoides pteronyssinus -    6 Dermatophagoides farinae -    7 Dog epithelium (canis spp) -    8 Cat hair (jose catus) -    Conclusion      Intradermal Testing (Placed Jul 17, 2023)  No Substance Conc.  Reading (15min)  immediate Papule [mm] / Erythema [mm] Reading   (... days)  delayed Papule [mm] / Erythema [mm] Remarks   DF Standard Dust Mite - D. Farinae 1:10 -   -    DP Standard Dust Mite - D. Pteronyssinus 1:10 -   -    A Aspergillus fumigatus  1:10 (+) 8/5  -    P Penicillium notatum 1:10 ? 5/5  -     Alternaria alt 1:10 -   -      1:10 -   -  "   Conclusions: no signs for relevant sensitization in IDT. Will observe if any delayed reaction      Fish and Seafood (Placed Jul 19, 2023)??  No Substance Readings (15min) Evaluation   POS Histamine 1mg/ml -    NEG NaCl 0.9% -      No Substance Readings (15min) Evaluation   1 Codfish (gadus morhua) -    2 Flounder (platichthys spp) -    3 Tuna (thunnus albecarus) -    4 Bass (centropristis striata) -    5 Mackerel (scomberomorus cavalla) -    6 Halibut (hipoglossus hipoglossus) -    7 [place] (salmo kamar) -    8 Catfish (ictalurus spp) -    9 Perch (serranus scriba) -    10 [place], Sy (oncorhynchus mykiss) -    11 Crab (callinectes spp) -    12 Lobster (homarus americanus) -    13 Shrimp white/brown/pink (farfantepenaeus&litopenaeus) -    14 Kingcrab (paralithodes camtschatica) -    15 Scallop (placopecten magellanicus) -    16 Clam (mercenaria mercenaria) -    17 Oyster (cstrea/crassostrea) -      Conclusion:    DRUG ALLERGY TEST SERIES Jul 17, 2023)    ANTIBIOTICS    Prick Tests         Substance/ Allergen Conc Result (20 min) Remarks    Bactrim 80/400 mg/ml        Intradermal Tests   immed immed delay delay      Substance Conc 1st dil  2nd dil  2 days  4 days remarks    Bactrim 1:100            Patch Tests  as is as is 1:2 1:2     As Is  Vas Substance Conc 2 days 4 days 2 days 4 days remarks   192 193 Bactrim 80/400 mg/ml              [] No relevant allergic reaction observed    [] Allergic reaction diagnosed against following allergens:      Interpretation/ remarks:   See later    [] Patient information given   [] ACDS CAMP information's (# ....) to following compounds: .....   [] General information's to following compounds: ......      Assessment & Plan:      ==> Final Diagnosis:     # recurrent dermatitis on hand left>right  DDx atopic dermatitis with dyshidrosis and irritant dermatitis  DDx allergic contact dermatitis  DDx tinea hands  * chronic illness with exacerbation, progression, side effects from  treatment    # atopic predisposition with    Seasonal RC (proven by Dr. Peck)    Dyshidrotic hand dermatitis  * chronic illness with exacerbation, progression, side effects from treatment      These conclusions are made at the best of one's knowledge and belief based on the provided evidence such as patient's history and allergy test results and they can change over time or can be incomplete because of missing information's.    ==> Treatment Plan:  >> fungal culture today and if filamentous fungi, then treat orally with Lamisil  >> plan patch and partial prick tests for molds and dust mites and maybe IDT       Procedures Performed: Allergy tests, including prick, intradermal and patch tests and drug allergy tests    Staff: : provider    Follow-up in Derm-Allergy clinic for 1st readings of patch tests after 2 days and 2nd readings and final conclusions after 4 days   ___________________________    I spent a total of 45 minutes with Vicky Carlson during today s  visit. This time was spent discussing all the individual test results, correlating them to the clinical relevance, counseling the patient and/or coordinating care and performing allergy tests

## 2023-07-17 ENCOUNTER — OFFICE VISIT (OUTPATIENT)
Dept: ALLERGY | Facility: CLINIC | Age: 63
End: 2023-07-17
Payer: COMMERCIAL

## 2023-07-17 DIAGNOSIS — H10.10 SEASONAL AND PERENNIAL ALLERGIC RHINOCONJUNCTIVITIS: ICD-10-CM

## 2023-07-17 DIAGNOSIS — J30.89 SEASONAL AND PERENNIAL ALLERGIC RHINOCONJUNCTIVITIS: ICD-10-CM

## 2023-07-17 DIAGNOSIS — L30.9 HAND DERMATITIS: ICD-10-CM

## 2023-07-17 DIAGNOSIS — L30.1 DYSHIDROTIC HAND DERMATITIS: ICD-10-CM

## 2023-07-17 DIAGNOSIS — L23.89 ALLERGIC CONTACT DERMATITIS DUE TO OTHER AGENTS: ICD-10-CM

## 2023-07-17 DIAGNOSIS — Z88.9 DRUG ALLERGY: Primary | ICD-10-CM

## 2023-07-17 DIAGNOSIS — L20.89 OTHER ATOPIC DERMATITIS: ICD-10-CM

## 2023-07-17 DIAGNOSIS — J30.2 SEASONAL AND PERENNIAL ALLERGIC RHINOCONJUNCTIVITIS: ICD-10-CM

## 2023-07-17 PROCEDURE — 95044 PATCH/APPLICATION TESTS: CPT | Performed by: DERMATOLOGY

## 2023-07-17 PROCEDURE — 95004 PERQ TESTS W/ALRGNC XTRCS: CPT | Performed by: DERMATOLOGY

## 2023-07-17 PROCEDURE — 95018 ALL TSTG PERQ&IQ DRUGS/BIOL: CPT | Performed by: DERMATOLOGY

## 2023-07-17 PROCEDURE — 95024 IQ TESTS W/ALLERGENIC XTRCS: CPT | Performed by: DERMATOLOGY

## 2023-07-17 NOTE — Clinical Note
7/17/2023         RE: Vicky Carlson  [address] [place] [address]        Dear Colleague,    Thank you for referring your patient, Vicky Carlson, to the [facility] [place]. Please see a copy of my visit note below.    [facility] Dermato-allergology Note  Office visit  Encounter Date: Jul 17, 2023  ____________________________________________    CC: No chief complaint on file.      HPI:  (Jul 17, 2023)  Ms. Vicky Carlson is a(n) 62 year old female who presents today as a return patient for allergy tests as planned  - Follow-up in Derm-Allergy clinic for allergy tests as planned  - Patient has about 7cm mass in the lung and it seems to shrink ==> maybe infectious  - otherwise feeling well in usual state of health    Physical exam:  General: In no acute distress, well-developed, well-nourished  Eyes: no conjunctivitis  ENT: no signs of rhinitis   Pulmonary: no wheezing or coughing  Skin: Focused examination of the skin on test sites was performed = see test results below  No active eczematous skin lesions on tests sites, particularly back    Earlier History and Allergy exams:  (Jun 13, 2023)  - Had in 2014 allergy testing with Dr. Peck and found pollen allergies then.   - since 2017 on back of hands first and then also palmar recurrent dermatitis left hand >> right hand  - one time had eczema below the left eye, otherwise other body areas not affected  >> Betamethasone ointment seems to make it worse  >> Elidel helped for some time      Past Medical History:   There is no problem list on file for this patient.    No past medical history on file.    Allergies:  Allergies   Allergen Reactions     Cats Other (See Comments)     Throat gets itchy/swelling      Seasonal Allergies Other (See Comments)     Matted swollen eyes.      Shellfish Allergy Other (See Comments) and Itching     Raw/fresh shrimp-oysters have caused eye swelling and throat itching.   Inconsistent.     Sulfa Antibiotics Unknown     Omeprazole Rash       Medications:  Current Outpatient Medications   Medication     albuterol (VENTOLIN HFA) 108 (90 Base) MCG/ACT inhaler     cholecalciferol 125 MCG (5000 UT) CAPS     Cyanocobalamin 5000 MCG TBDP     fish oil-omega-3 fatty acids (OMEGA-3 FISH OIL) 1000 MG capsule     fluticasone (FLONASE) 50 MCG/ACT nasal spray     levothyroxine (SYNTHROID/LEVOTHROID) 125 MCG tablet     multivitamin w/minerals (THERA-VIT-M) tablet     pimecrolimus (ELIDEL) 1 % external cream     sodium chloride (OCEAN) 0.65 % nasal spray     vitamin C (ASCORBIC ACID) 1000 MG TABS     Current Facility-Administered Medications   Medication     [START ON 7/17/2023] sulfamethoxazole-trimethoprim (BACTRIM) 80 mg for allergy testing       Social History:  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 Patient has the following hobbies or non-occupational exposure: golfing, paper crafts  Doesn't wear gloves really    Family History:  No family history on file.    Previous Labs, Allergy Tests, Dermatopathology, Imaging:  none    Referred By: Alex Peck MD  [address] [place] [address]     Allergy Tests:    Past Allergy Test    Order for Future Allergy Testing:    [] Outpatient  [] Inpatient: [facility]..../ Bed ....       Skin Atopy (atopic dermatitis) [] Yes   [x] No ..as child on feet dermatitis  Contact allergies:   [] Yes   [x] No ..........  Hand eczema:   [x] Yes   [] No recurrent dermatitis on back of hands and palmar left>right           Leading hand:   [x] R   [] L       [] Ambidextrous         Drug allergies:        [] Yes   [x] No  which?......    Urticaria/Angioedema  [] Yes   [] No .........  Food Allergy:  [x] Yes   [] No  Which?.if eats a lot of almonds get itchy in the mouth and soarness, shellfish like crab got stuffed in nose and mouth (shrimp/oyster no problems)   Pets :  [] Yes   [x] No  Which?..reacts to dogs and cats.         [x]  Rhinitis   [x]  Conjunctivitis   [x] Sinusitis   [] Polyposis   [] Otitis   [] Pharyngitis         []  Postnasal drip    []  none  Operations:   [] Tonsils   [] Septum   [] Sinus   [] Polyposis        [] Asthma bronchiale   [] Coughing      [x]  none  Symptoms (mostly Rhinoconjunctivitis and Asthma) aggravated by:  Season   [] I   [] II   [x] III   [x] IV   []V   []VI   []VII   [x]VIII   [x]IX   []X   []XI   []XII     [] perennial   Day time      [] morning   [] noon      [] evening        [] night    [] whole day........  []  none  Location/changes    [] inside        [] outside   [] mountains    [] sea     [] others.............   []  none  Triggers, specific     [] animals     [] plants     [] dust              [] others ...........................    []  none  Triggers, others       [] work          [] psyche    [] sport            [] others .............................  []  none  Irritant                [] phys efforts [] smoke    [] heat/cold     [] odors  []others............... []  none    Order for PATCH TESTS  Reason for tests (suspected allergy): recurrent hand dermatitis  Known previous allergies: none  Standardized panels  [x] Standard panel (40 tests)  [x] Preservatives & Antimicrobials (31 tests)  [x] Emulsifiers & Additives (25 tests)   [x] Perfumes/Flavours & Plants (25 tests)  [x] Hairdresser panel (12 tests)  [] Rubber Chemicals (22 tests)  [x] Plastics (26 tests)  [] Colorants/Dyes/Food additives (20 tests)  [] Metals (implants/dental) (24 tests)  [] Local anaesthetics/NSAIDs (13 tests)  [x] Antibiotics & Antimycotics (14 tests)   [x] Corticosteroids (15 tests)   [] Photopatch test (62 tests)   [] others: ...      [x] Patient's own products: Betamethasone    DO NOT test if chemical or biological identity is unknown!     always ask from patient the product information and safety sheets (MSDS)       Order for PRICK TESTS    Reason for tests (suspected allergy): not necessary  Known previous allergies: seasonal RC  tested with Dr. Peck    Standardized prick panels  [x] Atopic panel (20 tests) maybe only first 8 for pricks and if neg do IDT with delayed readings  [] Pediatric Panel (12 tests)  [] Milk, Meat, Eggs, Grains (20 tests)   [] Dust, Epithelia, Feathers (10 tests)  [x] Fish, Seafood, Shellfish (17 tests)  [] Nuts, Beans (14 tests)  [] Spice, Vegetable, Fruit (17 tests)  [] Pollen Panel = Tree, Grass, Weed (24 tests)  [] Others: ...      [] Patient's own products: ...    DO NOT test if chemical or biological identity is unknown!     always ask from patient the product information and safety sheets (MSDS)     Standardized intradermal tests  [] Penicillium notatum [] Aspergillus fumigatus [] House dust mites D.far & D. pteron  [] Cat    [] dog  [] Others: ...  [] Bee venom   [] Wasp venom  !!Specific protocol with dilutions!!       Order for Drug allergy tests (prick & Intradermal & patch tests)    [] Penicillin G  [] Ampicillin [] Cefazolin   [] Ceftriaxone   [] Ceftazidime  [x] Bactrim    [] Others: ...  Order for ... as test date    ________________________________    RESULTS & EVALUATION of PATCH TESTS    Jul 17, 2023 application of patch tests:    Patch test readings after     [x] 2 days, [] 3 days [x] 4 days, [] 5 days,  Other duration: ...      STANDARD Series                                          # Substance 2 days 4 days remarks     1 Cesar Mix [C] - -       2 Colophony - -       3  2-Mercaptobenzothiazole  - -       4 Methylisothiazolinone - -       5 Carba Mix - -       6 Thiuram Mix [A] - -       7 Bisphenol A Epoxy Resin - -       8 J-Pvab-Vjwrmdnphcq-Formaldehyde Resin - -       9 Mercapto Mix [A] - -       10 Black Rubber Mix- PPD [B] - -       11 Potassium Dichromate  -  -       12 Balsam of [place] (Myroxylon Pereirae Resin) - -       13 Nickel Sulphate Hexahydrate - -       14 Mixed Dialkyl Thiourea - -       15 Paraben Mix [B] - -       16 Methyldibromo Glutaronitrile - -       17 Fragrance Mix 8% - -        18 2-Bromo-2-Nitropropane-1,3-Diol (Bronopol) CT - -       19 Lyral - -       20 Tixocortol-21- Pivalate CT - -       21 Diazolidinyl urea (Germall II) - -        22 Methyl Methacrylate - -       23 Cobalt (II) Chloride Hexahydrate - -       24 Fragrance Mix II  - -       25 Compositae Mix - -       26 Benzoyl Peroxide - -       27 Bacitracin - -       28 Formaldehyde - -       29 Methylchloroisothiazolinone / Methylisothiazolinone - -       30 Corticosteroid Mix CT - -       31 Sodium Lauryl Sulfate - -       32 Lanolin Alcohol - -       33 Turpentine - -       34 Cetylstearylalcohol - -       35 Chlorhexidine Dicluconate - -       36 Budesonide - -       37 Imidazolidinyl Urea  - -       38 Ethyl-2 Cyanoacrylate - -       39 Quaternium 15 (Dowicil 200) - -       40 Decyl Glucoside - -       PRESERVATIVES & ANTIMICROBIALS        # Substance 2 days 4 days remarks   41 1  1,2-Benzisothiazoline-3-One, Sodium Salt - -     2  1,3,5-Ryder (2-Hydroxyethyl) - Hexahydrotriazine (Grotan BK) - -     3 5-Tglykvlebigut-6-Nitro-1, 3-Propanediol - -     4  3, 4, 4' - Triclocarban - -    45 5 4 - Chloro - 3 - Cresol - -     6 4 - Chloro - 4 - Xylenol (PCMX) - -     7 7-Ethylbicyclooxazolidine (Bioban [id number]) - -     8 Benzalkonium Chloride CT - -     9 Benzyl Alcohol - -    50 10 Cetalkonium Chloride - -     11 Cetylpyrimidine Chloride  - -     12 Chloroacetamide - -     13 DMDM Hydantoin - -     14 Glutaraldehyde - -    55 15 Triclosan - -     16 Glyoxal Trimeric Dihydrate - -     17 Iodopropynyl Butylcarbamate - -     18 Octylisothiazoline - -     19 Bithionol CT - -    60 20 Bioban P 1487 (Nitrobutyl) Morpholine/(Ethylnitro-Trimethylene) Dimorpholine - -     21 Phenoxyethanol - -     22 Phenyl Salicylate - -     23 Povidone Iodine - -     24 Sodium Benzoate - -    65 25 Sodium Disulfite - -     26 Sorbic Acid - -     27 Thimerosal - -     28 Melamine Formaldehyde Resin - -     29 Ethylenediamine Dihydrochloride - -       Parabens      70 30 Butyl-P-Hydroxybenzoate - -     31 Ethyl-P-Hydroxybenzoate - -     32 Methyl-P-Hydroxybenzoate - -    73 33 Propyl-P-Hydroxybenzoate - -      EMULSIFIERS & ADDITIVES       # Substance 2 days 4 days remarks   74 1 Polyethylene Glycol-400 - -    75 2 Cocamidopropyl Betaine - -     3 Amerchol L101 - -     4 Propylene Glycol - -     5 Triethanolamine - -     6 Sorbitane Sesquiolate CT - -    80 7 Isopropylmyristate - -     8 Polysorbate 80 CT - -     9 Amidoamine   (Stearamidopropyl Dimethylamine) - -     10 Oleamidopropyl Dimethylamine - -     11 Lauryl Glucoside - -    85 12 Coconut Diethanolamide  - -     13 2-Hydroxy-4-Methoxy Benzophenone (Oxybenzone) - -     14 Benzophenone-4 (Sulisobenzon) - -     15 Propolis - -     16 Dexpanthenol - -    90 17 Abitol - -     18 Tert-Butylhydroquinone - -     19 Benzyl Salicylate - -     20 Dimethylaminopropylamin (DMPA) - -     21 Zinc Pyrithione (Zinc Omadine)  - -    95 22 Ryder(Hydroxymethyl) Nitromethane  - -      Antioxidant       23 Dodecyl Gallate - -     24 Butylhydroxyanisole (BHA) - -     25 Butylhydroxytoluene (BHT) - -     26 Di-Alpha-Tocopherol (Vit E) - -    100 27 Propyl Gallate - -      PERFUMES, FLAVORS & PLANTS        # Substance 2 days 4 days remarks   101 1 Benzyl Cinnamate - -     2 Di-Limonene (Dipentene) - -     3 Cananga Odorata (Raffaele Castorena) (I) - -     4 Lichen Acid Mix - -    105 5 Mentha Piperita Oil (Peppermint Oil) - -     6 Sesquiterpenelactone mix - -     7 Tea Tree Oil, Oxidized - -     8 Wood Tar Mix - -     9 Abietic Acid - -    110 10 Lavendula Angustifolia Oil (Lavender Oil) - -     11 Fragrance mix II CT * 14% - -      Fragrance Mix I       12 Oakmoss Absolute - -     13 Eugenol - -     14 Geraniol - -    115 15 Hydroxycitronellal - -     16 Isoeugenol - -     17 Cinnamic Aldehyde - -     18 Cinnamic Alcohol  - -      Fragrance mix II       19 Citronellol - -    120 20 Alpha-Hexylcinnamic Aldehyde    - -     21 Citral - -      22 Farnesol - -    123 23 Coumarin - -      Hexylcinnamic aldehyde, Coumarin, Farnesol, Hydroxyisohexy3-cyclohexene carboxaldehyde, citral, citrolellol  HAIRDRESSER        # Substance 2 days 4 days remarks   124 1 P-Phenylenediamine -  -    125 2 P-Toluenediamine Sulphate  -  -     3 Ammonium Thioglycolate - -     4 Ammonium Persulfate - -     5 Resorcinol - -     6 3-Aminophenol - -    130 7 P-Aminophenol - -     8 Glyceryl Monothioglycolate - -    132 9 Pyrogallol - -      PLASTICS (Acrylates/Epoxy Systems)       # Substance 2 days 4 days remarks     Acrylates - -    133 1 2-Hydroxyethyl Methacrylate (HEMA) - -    134 2 1,4-Butandioldimethacrylate (BUDMA) - -     3  2-Ethylhexyl Acrylate - -     4 Bisphenol-A-Dimethacrylate  - -     5 Diurethane-Dimethacrylate - -     6 Ethyleneglycoldimethacrylate (EGDMA) - -     7 Pentaerythritoltriacrylate (AYE) - -    140 8 Triethylene Glycol Dimethacrylate (TEGDMA) - -      Synthetic material/additives        9 D-Izor-Xzntlrlqcca - -     10 Tricresyl Phosphate - -     11 0-Dgnr-Aikkjfpabpsfe - -     12 Dioctyl phtalate(DEHP,DOP) / (Dimethylhexylphthalate)  - -    145 13 Dibutylphthalate - -     14 Dimethylphthalate - -     15 Toluene-2,4-Diisocyanate - -     16 Diphenylmethane-4,4''-Diisocyanate - -      EPOXY RESIN SYSTEMS        Reactive Solvents - -     17 Cresyl Glycidyl Ether - -    150 18 Butyl Glycidyl Ether - -     19 Phenyl Glycidyl Ether - -     20 1,4-Butanediol Diglycidyl Ether - -     21 1,6-Hexanediole Diglycidyl Ether - -      Hardener / Accelerator - -     22 Triethylenetetramine - -    155 23 Diethylenetriamine - -     24 Isophorone Diamine (IPD) - -     25 N,N-Dimethyl-P-Toluidine - -    158 26 Isobornyl Acrylate - -      ANTIBIOTICS & ANTIMYCOTICS    # Substance 2 days 4 days remarks   159 1 Erythromycin - -    160 2 Framycetin Sulfate - -     3 Fusidic Acid Sodium Salt - -     4 Gentamicin Sulfate - -     5 Neomycin Sulfate - -     6 Oxytetracycline   - -    165 7 Polymyxin B Sulfate - -     8 Tetracycline-HCL - -     9 Sulfanilamide - -     10 Metronidazole - -     11 Nitrofurazone - -    170 12 Nystatin - -     13 Clotrimazole - -     14 Clioquinol 5% - -     15 Miconazole  - -    174 16 Tobramycine           CORTICOSTEROIDS   # Substance 2 days 4 days remarks Allergy  class   175 1 Amcinonide - -  B    2 Betametasone-17,21 Dipropionate - -  D1    3 Desoximetasone - -  C    4 Betamethasone-17-Valerate - -  D1    5 Dexamethasone - -  C   180 6 Hydrocortisone - -  A    7 Clobetasol-17-Propionate - -  D1    8 Dexamethasone-21-Phosphate Disodium Salt - -  C    9 Hydrocortisone-17 Butyrate - -  D2    10 Prednisolone - -  A   185 11 Triamcinolone Acetonide - -  B    12 Methylprednisolone Aceponate - -  D2    13 Hydrocortisone-21-Acetate - -  A   188 14 Prednicarbate - -  D2     Group Characteristics of group Generic name Name  cross reactions   A Hydrocortisone   Cloprednole, Fludrocortisone acétate, Hydrocortison acetate, Methylprednisolone, Prednisolone, Tixocortolpivalate Alfacortone, Fucidin H, Dermacalm, Hexacortone, Premandole, Imacort With group D2   B Triamcinolone-acetonide   Budenoside (R-isomer), Amcinonide, Desonide, Fluocinolone acetonide, Triamcinolone acetonide Locapred, Locatop  Synalar, Pevisone, Kenacort -   C Betamethasone (Without Nieves)   Betamethasone, Dexamethasone, Flumethasone pivalate, Halomethasone Daivobet, Dexasalyl, Locasalen,   -   D1 Betamethasone-diproprionate   Betamethasone dipropionate, Betamethasone-17-valerate, Clobetasole-propionate, Fluticasone propionate, Mometasone furoate Betnovate, Diprogenta, Diprosalic, Diprosone, Celestoderm, Fucicort,  Cutivate, Axotide, Elocom -   D2 Methylprednisolone-aceponate   Hydrocortisone-aceponate, Hydrocortisone-buteprate, Hydrocortisone-17-butyrate, Methylprednisolone aceponate, Prednicarbate Locoïd, Advantan,  Prednitop With group A and Budesonide (S-isomer)       OTHER PRODUCTS        #  "Substance Conc  % solv 2 days 4 days remarks   189 1 betamethasone As is       190 2 Levothyroxin As is       191 3 Levothyroxin 50 vas       4          5          6          7          8          9          10           Patients own products:  è DO NOT test if chemical or biological identity is unknown!   - always ask from patient the product information and safety sheets and consult with the physician   - check neutral pH with pH indicator paper (do not test pH below 5 or over 8 or consult with physician)  - leave-on cosmetics can be tested \"as is\"  - rinse-off products have to be diluted with water, buffer, olive oil or paraffin (discuss with physician)  à remember:   - non-specific toxic/corrosive or biological reactions can occur  - tests with patients own products are not standardized and test conditions are not optimized for patch tests. Whenever possible test with standardized test series and correlate use of product with the result of the patch tests  - if not sure if compounds can be tested under occlusion in patch tests consider open application tests      Results of patch tests:                         Interpretation:  - Negative                    A    = Allergic      (+) Erythema    TI   = Toxic/irritant   + E + Infiltration    RaP = Relevance at Present     ++ E/I + Papulovesicle   Rpr  = Relevance Previously     +++ E/I/P + Blister     nR   = No Relevance      Atopy Screen (Placed Jul 17, 2023)  No Substance Readings (15 min) Evaluation   POS Histamine 1mg/ml -    NEG NaCl 0.9% -      No Substance Readings (15 min) Evaluation   1 Alternaria alternata (tenuis)  -    2 Cladosporium herbarum -    3 Aspergillus fumigatus -    4 Penicillium notatum -    5 Dermatophagoides pteronyssinus -    6 Dermatophagoides farinae -    7 Dog epithelium (canis spp) -    8 Cat hair (jose catus) -    Conclusion      Intradermal Testing (Placed Jul 17, 2023)  No Substance Conc.  Reading (15min)  immediate Papule [mm] / " Erythema [mm] Reading   (... days)  delayed Papule [mm] / Erythema [mm] Remarks   DF Standard Dust Mite - D. Farinae 1:10 -   -    DP Standard Dust Mite - D. Pteronyssinus 1:10 -   -    A Aspergillus fumigatus  1:10 (+) 8/5  -    P Penicillium notatum 1:10 ? 5/5  -     Alternaria alt 1:10 -   -      1:10 -   -    Conclusions: no signs for relevant sensitization in IDT. Will observe if any delayed reaction      Fish and Seafood (Placed Jul 19, 2023)??  No Substance Readings (15min) Evaluation   POS Histamine 1mg/ml -    NEG NaCl 0.9% -      No Substance Readings (15min) Evaluation   1 Codfish (gadus morhua) -    2 Flounder (platichthys spp) -    3 Tuna (thunnus albecarus) -    4 Bass (centropristis striata) -    5 Mackerel (scomberomorus cavalla) -    6 Halibut (hipoglossus hipoglossus) -    7 [place] (salmo kamar) -    8 Catfish (ictalurus spp) -    9 Perch (serranus scriba) -    10 [place], Sy (oncorhynchus mykiss) -    11 Crab (callinectes spp) -    12 Lobster (homarus americanus) -    13 Shrimp white/brown/pink (farfantepenaeus&litopenaeus) -    14 Kingcrab (paralithodes camtschatica) -    15 Scallop (placopecten magellanicus) -    16 Clam (mercenaria mercenaria) -    17 Oyster (cstrea/crassostrea) -      Conclusion:    DRUG ALLERGY TEST SERIES Jul 17, 2023)    ANTIBIOTICS    Prick Tests         Substance/ Allergen Conc Result (20 min) Remarks    Bactrim 80/400 mg/ml        Intradermal Tests   immed immed delay delay      Substance Conc 1st dil  2nd dil  2 days  4 days remarks    Bactrim 1:100            Patch Tests  as is as is 1:2 1:2     As Is  Vas Substance Conc 2 days 4 days 2 days 4 days remarks   192 193 Bactrim 80/400 mg/ml              [] No relevant allergic reaction observed    [] Allergic reaction diagnosed against following allergens:      Interpretation/ remarks:   See later    [] Patient information given   [] ACDS CAMP information's (# ....) to following compounds: .....   [] General information's  to following compounds: ......      Assessment & Plan:      ==> Final Diagnosis:     # recurrent dermatitis on hand left>right  DDx atopic dermatitis with dyshidrosis and irritant dermatitis  DDx allergic contact dermatitis  DDx tinea hands  * chronic illness with exacerbation, progression, side effects from treatment    # atopic predisposition with    Seasonal RC (proven by Dr. Peck)    Dyshidrotic hand dermatitis  * chronic illness with exacerbation, progression, side effects from treatment      These conclusions are made at the best of one's knowledge and belief based on the provided evidence such as patient's history and allergy test results and they can change over time or can be incomplete because of missing information's.    ==> Treatment Plan:  >> fungal culture today and if filamentous fungi, then treat orally with Lamisil  >> plan patch and partial prick tests for molds and dust mites and maybe IDT       Procedures Performed: Allergy tests, including prick, intradermal and patch tests and drug allergy or provocation tests***    {[id number]}: provider    Follow-up in Derm-Allergy clinic for 1st readings of patch tests after 2 days and 2nd readings and final conclusions after 4 days   ___________________________    I spent a total of 45 minutes with Vicky Carlson during today s  visit. This time was spent discussing all the individual test results, correlating them to the clinical relevance, counseling the patient and/or coordinating care and performing allergy tests             Again, thank you for allowing me to participate in the care of your patient.        Sincerely,        Jeff Wild MD

## 2023-07-17 NOTE — NURSING NOTE
Chief Complaint   Patient presents with     Allergy Recheck     Patch day 1     Shawanda FRANKLIN RN-BSN  Dermatology Surgery  [phone]

## 2023-07-19 ENCOUNTER — OFFICE VISIT (OUTPATIENT)
Dept: ALLERGY | Facility: CLINIC | Age: 63
End: 2023-07-19
Payer: COMMERCIAL

## 2023-07-19 DIAGNOSIS — L23.89 ALLERGIC CONTACT DERMATITIS DUE TO OTHER AGENTS: ICD-10-CM

## 2023-07-19 DIAGNOSIS — L30.9 HAND DERMATITIS: Primary | ICD-10-CM

## 2023-07-19 DIAGNOSIS — L30.1 DYSHIDROTIC HAND DERMATITIS: ICD-10-CM

## 2023-07-19 DIAGNOSIS — Z88.9 DRUG ALLERGY: ICD-10-CM

## 2023-07-19 DIAGNOSIS — L20.89 OTHER ATOPIC DERMATITIS: ICD-10-CM

## 2023-07-19 PROCEDURE — 99207 PR NO CHARGE LOS: CPT | Performed by: DERMATOLOGY

## 2023-07-19 NOTE — PROGRESS NOTES
[facility] Dermato-allergology Note  Office visit  Encounter Date: Jul 19, 2023  ____________________________________________    CC: No chief complaint on file.      HPI:  (Jul 19, 2023)  Ms. Vicky Carlson is a(n) 62 year old female who presents today as a return patient for allergy consultation  - Follow-up in Derm-Allergy clinic for 1st readings of patch tests after 2 days    - otherwise feeling well in usual state of health    Physical exam:  General: In no acute distress, well-developed, well-nourished  Eyes: no conjunctivitis  ENT: no signs of rhinitis   Pulmonary: no wheezing or coughing  Skin:Focused examination of the skin on test sites was performed = see test results below    Earlier History and Allergy exams:  (Jul 17, 2023)  - Follow-up in Derm-Allergy clinic for allergy tests as planned  - Patient has about 7cm mass in the lung and it seems to shrink ==> maybe infectious    Earlier History and Allergy exams:  (Jun 13, 2023)  - Had in 2014 allergy testing with Dr. Peck and found pollen allergies then.   - since 2017 on back of hands first and then also palmar recurrent dermatitis left hand >> right hand  - one time had eczema below the left eye, otherwise other body areas not affected  >> Betamethasone ointment seems to make it worse  >> Elidel helped for some time      Past Medical History:   There is no problem list on file for this patient.    No past medical history on file.    Allergies:  Allergies   Allergen Reactions     Cats Other (See Comments)     Throat gets itchy/swelling      Seasonal Allergies Other (See Comments)     Matted swollen eyes.      Shellfish Allergy Other (See Comments) and Itching     Raw/fresh shrimp-oysters have caused eye swelling and throat itching.  Inconsistent.     Sulfa Antibiotics Unknown     Omeprazole Rash       Medications:  Current Outpatient Medications   Medication     albuterol (VENTOLIN HFA) 108 (90 Base) MCG/ACT inhaler      cholecalciferol 125 MCG (5000 UT) CAPS     Cyanocobalamin 5000 MCG TBDP     fish oil-omega-3 fatty acids (OMEGA-3 FISH OIL) 1000 MG capsule     fluticasone (FLONASE) 50 MCG/ACT nasal spray     levothyroxine (SYNTHROID/LEVOTHROID) 125 MCG tablet     multivitamin w/minerals (THERA-VIT-M) tablet     pimecrolimus (ELIDEL) 1 % external cream     sodium chloride (OCEAN) 0.65 % nasal spray     vitamin C (ASCORBIC ACID) 1000 MG TABS     No current facility-administered medications for this visit.       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 Patient has the following hobbies or non-occupational exposure: golfing, paper crafts  Doesn't wear gloves really    Family History:  No family history on file.    Previous Labs, Allergy Tests, Dermatopathology, Imaging:  none    Referred By: Alex Peck MD  [address] [address]     Allergy Tests:    Past Allergy Test    Order for Future Allergy Testing:    [] Outpatient  [] Inpatient: [facility]..../ Bed ....       Skin Atopy (atopic dermatitis) [] Yes   [x] No ..as child on feet dermatitis  Contact allergies:   [] Yes   [x] No ..........  Hand eczema:   [x] Yes   [] No recurrent dermatitis on back of hands and palmar left>right           Leading hand:   [x] R   [] L       [] Ambidextrous         Drug allergies:        [] Yes   [x] No  which?......    Urticaria/Angioedema  [] Yes   [] No .........  Food Allergy:  [x] Yes   [] No  Which?.if eats a lot of almonds get itchy in the mouth and soarness, shellfish like crab got stuffed in nose and mouth (shrimp/oyster no problems)   Pets :  [] Yes   [x] No  Which?..reacts to dogs and cats.         [x]  Rhinitis   [x] Conjunctivitis   [x] Sinusitis   [] Polyposis   [] Otitis   [] Pharyngitis         []  Postnasal drip    []  none  Operations:   [] Tonsils   [] Septum   [] Sinus   [] Polyposis        [] Asthma bronchiale   [] Coughing      [x]  none  Symptoms (mostly Rhinoconjunctivitis and Asthma)  aggravated by:  Season   [] I   [] II   [x] III   [x] IV   []V   []VI   []VII   [x]VIII   [x]IX   []X   []XI   []XII     [] perennial   Day time      [] morning   [] noon      [] evening        [] night    [] whole day........  []  none  Location/changes    [] inside        [] outside   [] mountains    [] sea     [] others.............   []  none  Triggers, specific     [] animals     [] plants     [] dust              [] others ...........................    []  none  Triggers, others       [] work          [] psyche    [] sport            [] others .............................  []  none  Irritant                [] phys efforts [] smoke    [] heat/cold     [] odors  []others............... []  none    Order for PATCH TESTS  Reason for tests (suspected allergy): recurrent hand dermatitis  Known previous allergies: none  Standardized panels  [x] Standard panel (40 tests)  [x] Preservatives & Antimicrobials (31 tests)  [x] Emulsifiers & Additives (25 tests)   [x] Perfumes/Flavours & Plants (25 tests)  [x] Hairdresser panel (12 tests)  [] Rubber Chemicals (22 tests)  [x] Plastics (26 tests)  [] Colorants/Dyes/Food additives (20 tests)  [] Metals (implants/dental) (24 tests)  [] Local anaesthetics/NSAIDs (13 tests)  [x] Antibiotics & Antimycotics (14 tests)   [x] Corticosteroids (15 tests)   [] Photopatch test (62 tests)   [] others: ...      [x] Patient's own products: Betamethasone    DO NOT test if chemical or biological identity is unknown!     always ask from patient the product information and safety sheets (MSDS)       Order for PRICK TESTS    Reason for tests (suspected allergy): not necessary  Known previous allergies: seasonal RC tested with Dr. Peck    Standardized prick panels  [x] Atopic panel (20 tests) maybe only first 8 for pricks and if neg do IDT with delayed readings  [] Pediatric Panel (12 tests)  [] Milk, Meat, Eggs, Grains (20 tests)   [] Dust, Epithelia, Feathers (10 tests)  [x] Fish, Seafood,  Shellfish (17 tests)  [] Nuts, Beans (14 tests)  [] Spice, Vegetable, Fruit (17 tests)  [] Pollen Panel = Tree, Grass, Weed (24 tests)  [] Others: ...      [] Patient's own products: ...    DO NOT test if chemical or biological identity is unknown!     always ask from patient the product information and safety sheets (MSDS)     Standardized intradermal tests  [] Penicillium notatum [] Aspergillus fumigatus [] House dust mites D.far & D. pteron  [] Cat    [] dog  [] Others: ...  [] Bee venom   [] Wasp venom  !!Specific protocol with dilutions!!       Order for Drug allergy tests (prick & Intradermal & patch tests)    [] Penicillin G  [] Ampicillin [] Cefazolin   [] Ceftriaxone   [] Ceftazidime  [x] Bactrim    [] Others: ...  Order for ... as test date    ________________________________    RESULTS & EVALUATION of PATCH TESTS    Jul 17, 2023 application of patch tests:    Patch test readings after     [x] 2 days, [] 3 days [x] 4 days, [] 5 days,  Other duration: ...      STANDARD Series                                          # Substance 2 days 4 days remarks     1 Cesar Mix [C] - -       2 Colophony - -       3  2-Mercaptobenzothiazole  - -       4 Methylisothiazolinone +/++ -       5 Carba Mix - -       6 Thiuram Mix [A] - -       7 Bisphenol A Epoxy Resin - -       8 C-Nuny-Sebukjkldmu-Formaldehyde Resin - -       9 Mercapto Mix [A] - -       10 Black Rubber Mix- PPD [B] - -       11 Potassium Dichromate  -  -       12 Balsam of [place] (Myroxylon Pereirae Resin) - -       13 Nickel Sulphate Hexahydrate - -       14 Mixed Dialkyl Thiourea - -       15 Paraben Mix [B] - -       16 Methyldibromo Glutaronitrile + -       17 Fragrance Mix 8% + -       18 2-Bromo-2-Nitropropane-1,3-Diol (Bronopol) CT - -       19 Lyral - -       20 Tixocortol-21- Pivalate CT - -       21 Diazolidinyl urea (Germall II) - -        22 Methyl Methacrylate - -       23 Cobalt (II) Chloride Hexahydrate - -       24 Fragrance Mix II  - -        25 Compositae Mix - -       26 Benzoyl Peroxide - -       27 Bacitracin - -       28 Formaldehyde - -       29 Methylchloroisothiazolinone / Methylisothiazolinone - -       30 Corticosteroid Mix CT - -       31 Sodium Lauryl Sulfate - -       32 Lanolin Alcohol - -       33 Turpentine - -       34 Cetylstearylalcohol - -       35 Chlorhexidine Dicluconate - -       36 Budesonide - -       37 Imidazolidinyl Urea  - -       38 Ethyl-2 Cyanoacrylate - -       39 Quaternium 15 (Dowicil 200) - -       40 Decyl Glucoside - -       PRESERVATIVES & ANTIMICROBIALS        # Substance 2 days 4 days remarks   41 1  1,2-Benzisothiazoline-3-One, Sodium Salt - -     2  1,3,5-Ryder (2-Hydroxyethyl) - Hexahydrotriazine (Grotan BK) - -     3 9-Twiywbpgniybk-3-Nitro-1, 3-Propanediol - -     4  3, 4, 4' - Triclocarban - -    45 5 4 - Chloro - 3 - Cresol - -     6 4 - Chloro - 4 - Xylenol (PCMX) - -     7 7-Ethylbicyclooxazolidine (Bioban [id number]) - -     8 Benzalkonium Chloride CT - -     9 Benzyl Alcohol - -    50 10 Cetalkonium Chloride - -     11 Cetylpyrimidine Chloride  - -     12 Chloroacetamide - -     13 DMDM Hydantoin - -     14 Glutaraldehyde - -    55 15 Triclosan - -     16 Glyoxal Trimeric Dihydrate - -     17 Iodopropynyl Butylcarbamate - -     18 Octylisothiazoline - -     19 Bithionol CT - -    60 20 Bioban P 1487 (Nitrobutyl) Morpholine/(Ethylnitro-Trimethylene) Dimorpholine - -     21 Phenoxyethanol - -     22 Phenyl Salicylate - -     23 Povidone Iodine - -     24 Sodium Benzoate - -    65 25 Sodium Disulfite - -     26 Sorbic Acid - -     27 Thimerosal - -     28 Melamine Formaldehyde Resin - -     29 Ethylenediamine Dihydrochloride - -      Parabens      70 30 Butyl-P-Hydroxybenzoate - -     31 Ethyl-P-Hydroxybenzoate - -     32 Methyl-P-Hydroxybenzoate - -    73 33 Propyl-P-Hydroxybenzoate - -      EMULSIFIERS & ADDITIVES       # Substance 2 days 4 days remarks   74 1 Polyethylene Glycol-400 - -    75 2  Cocamidopropyl Betaine - -     3 Amerchol L101 - -     4 Propylene Glycol - -     5 Triethanolamine - -     6 Sorbitane Sesquiolate CT - -    80 7 Isopropylmyristate - -     8 Polysorbate 80 CT - -     9 Amidoamine   (Stearamidopropyl Dimethylamine) - -     10 Oleamidopropyl Dimethylamine - -     11 Lauryl Glucoside - -    85 12 Coconut Diethanolamide  - -     13 2-Hydroxy-4-Methoxy Benzophenone (Oxybenzone) - -     14 Benzophenone-4 (Sulisobenzon) - -     15 Propolis - -     16 Dexpanthenol - -    90 17 Abitol - -     18 Tert-Butylhydroquinone - -     19 Benzyl Salicylate - -     20 Dimethylaminopropylamin (DMPA) - -     21 Zinc Pyrithione (Zinc Omadine)  - -    95 22 Ryder(Hydroxymethyl) Nitromethane  - -      Antioxidant       23 Dodecyl Gallate - -     24 Butylhydroxyanisole (BHA) - -     25 Butylhydroxytoluene (BHT) - -     26 Di-Alpha-Tocopherol (Vit E) - -    100 27 Propyl Gallate - -      PERFUMES, FLAVORS & PLANTS        # Substance 2 days 4 days remarks   101 1 Benzyl Cinnamate - -     2 Di-Limonene (Dipentene) - -     3 Cananga Odorata (Raffaele Castorena) (I) - -     4 Lichen Acid Mix - -    105 5 Mentha Piperita Oil (Peppermint Oil) - -     6 Sesquiterpenelactone mix - -     7 Tea Tree Oil, Oxidized - -     8 Wood Tar Mix (+) -     9 Abietic Acid - -    110 10 Lavendula Angustifolia Oil (Lavender Oil) - -     11 Fragrance mix II CT * 14% - -      Fragrance Mix I       12 Oakmoss Absolute - -     13 Eugenol - -     14 Geraniol - -    115 15 Hydroxycitronellal - -     16 Isoeugenol - -     17 Cinnamic Aldehyde - -     18 Cinnamic Alcohol  - -      Fragrance mix II       19 Citronellol - -    120 20 Alpha-Hexylcinnamic Aldehyde    - -     21 Citral - -     22 Farnesol - -    123 23 Coumarin - -      Hexylcinnamic aldehyde, Coumarin, Farnesol, Hydroxyisohexy3-cyclohexene carboxaldehyde, citral, citrolellol  HAIRDRESSER        # Substance 2 days 4 days remarks   124 1 P-Phenylenediamine -  -    125 2  P-Toluenediamine Sulphate  -  -     3 Ammonium Thioglycolate - -     4 Ammonium Persulfate - -     5 Resorcinol - -     6 3-Aminophenol - -    130 7 P-Aminophenol - -     8 Glyceryl Monothioglycolate - -    132 9 Pyrogallol - -      PLASTICS (Acrylates/Epoxy Systems)       # Substance 2 days 4 days remarks     Acrylates - -    133 1 2-Hydroxyethyl Methacrylate (HEMA) - -    134 2 1,4-Butandioldimethacrylate (BUDMA) - -     3  2-Ethylhexyl Acrylate - -     4 Bisphenol-A-Dimethacrylate  - -     5 Diurethane-Dimethacrylate - -     6 Ethyleneglycoldimethacrylate (EGDMA) - -     7 Pentaerythritoltriacrylate (AYE) - -    140 8 Triethylene Glycol Dimethacrylate (TEGDMA) - -      Synthetic material/additives        9 Q-Oeos-Oaeusykfnfg - -     10 Tricresyl Phosphate - -     11 4-Fkxg-Apnnmrmcqhdxj - -     12 Dioctyl phtalate(DEHP,DOP) / (Dimethylhexylphthalate)  - -    145 13 Dibutylphthalate - -     14 Dimethylphthalate - -     15 Toluene-2,4-Diisocyanate - -     16 Diphenylmethane-4,4''-Diisocyanate - -      EPOXY RESIN SYSTEMS        Reactive Solvents - -     17 Cresyl Glycidyl Ether - -    150 18 Butyl Glycidyl Ether - -     19 Phenyl Glycidyl Ether - -     20 1,4-Butanediol Diglycidyl Ether - -     21 1,6-Hexanediole Diglycidyl Ether - -      Hardener / Accelerator - -     22 Triethylenetetramine - -    155 23 Diethylenetriamine - -     24 Isophorone Diamine (IPD) - -     25 N,N-Dimethyl-P-Toluidine - -    158 26 Isobornyl Acrylate - -      ANTIBIOTICS & ANTIMYCOTICS    # Substance 2 days 4 days remarks   159 1 Erythromycin - -    160 2 Framycetin Sulfate - -     3 Fusidic Acid Sodium Salt - -     4 Gentamicin Sulfate - -     5 Neomycin Sulfate - -     6 Oxytetracycline  - -    165 7 Polymyxin B Sulfate - -     8 Tetracycline-HCL - -     9 Sulfanilamide - -     10 Metronidazole - -     11 Nitrofurazone - -    170 12 Nystatin - -     13 Clotrimazole - -     14 Clioquinol 5% - -     15 Miconazole  - -    174 16  Tobramycine           CORTICOSTEROIDS   # Substance 2 days 4 days remarks Allergy  class   175 1 Amcinonide - -  B    2 Betametasone-17,21 Dipropionate - -  D1    3 Desoximetasone - -  C    4 Betamethasone-17-Valerate - -  D1    5 Dexamethasone - -  C   180 6 Hydrocortisone - -  A    7 Clobetasol-17-Propionate - -  D1    8 Dexamethasone-21-Phosphate Disodium Salt - -  C    9 Hydrocortisone-17 Butyrate - -  D2    10 Prednisolone - -  A   185 11 Triamcinolone Acetonide - -  B    12 Methylprednisolone Aceponate - -  D2    13 Hydrocortisone-21-Acetate - -  A   188 14 Prednicarbate - -  D2     Group Characteristics of group Generic name Name  cross reactions   A Hydrocortisone   Cloprednole, Fludrocortisone acétate, Hydrocortison acetate, Methylprednisolone, Prednisolone, Tixocortolpivalate Alfacortone, Fucidin H, Dermacalm, Hexacortone, Premandole, Imacort With group D2   B Triamcinolone-acetonide   Budenoside (R-isomer), Amcinonide, Desonide, Fluocinolone acetonide, Triamcinolone acetonide Locapred, Locatop  Synalar, Pevisone, Ken[place]ort -   C Betamethasone (Without Nieves)   Betamethasone, Dexamethasone, Flumethasone pivalate, Halomethasone Daivobet, Dexasalyl, Locasalen,   -   D1 Betamethasone-diproprionate   Betamethasone dipropionate, Betamethasone-17-valerate, Clobetasole-propionate, Fluticasone propionate, Mometasone furoate Betnovate, Diprogenta, Diprosalic, Diprosone, Celestoderm, Fucicort,  Cutivate, Axotide, Elocom -   D2 Methylprednisolone-aceponate   Hydrocortisone-aceponate, Hydrocortisone-buteprate, Hydrocortisone-17-butyrate, Methylprednisolone aceponate, Prednicarbate Locoïd, Advantan,  Prednitop With group A and Budesonide (S-isomer)       OTHER PRODUCTS        # Substance Conc  % solv 2 days 4 days remarks   189 1 betamethasone As is       190 2 Levothyroxin As is       191 3 Levothyroxin 50 vas        Patients own products:  è DO NOT test if chemical or biological identity is unknown!   - always ask  "from patient the product information and safety sheets and consult with the physician   - check neutral pH with pH indicator paper (do not test pH below 5 or over 8 or consult with physician)  - leave-on cosmetics can be tested \"as is\"  - rinse-off products have to be diluted with water, buffer, olive oil or paraffin (discuss with physician)  à remember:   - non-specific toxic/corrosive or biological reactions can occur  - tests with patients own products are not standardized and test conditions are not optimized for patch tests. Whenever possible test with standardized test series and correlate use of product with the result of the patch tests  - if not sure if compounds can be tested under occlusion in patch tests consider open application tests      Results of patch tests:                         Interpretation:  - Negative                    A    = Allergic      (+) Erythema    TI   = Toxic/irritant   + E + Infiltration    RaP = Relevance at Present     ++ E/I + Papulovesicle   Rpr  = Relevance Previously     +++ E/I/P + Blister     nR   = No Relevance      Atopy Screen (Placed Jul 17, 2023)  No Substance Readings (15 min) Evaluation   POS Histamine 1mg/ml -    NEG NaCl 0.9% -      No Substance Readings (15 min) Evaluation   1 Alternaria alternata (tenuis)  -    2 Cladosporium herbarum -    3 Aspergillus fumigatus -    4 Penicillium notatum -    5 Dermatophagoides pteronyssinus -    6 Dermatophagoides farinae -    7 Dog epithelium (canis spp) -    8 Cat hair (jose catus) -    Conclusion      Intradermal Testing (Placed Jul 17, 2023)  No Substance Conc.  Reading (15min)  immediate Papule [mm] / Erythema [mm] Reading   (... days)  delayed Papule [mm] / Erythema [mm] Remarks   DF Standard Dust Mite - D. Farinae 1:10 -   -    DP Standard Dust Mite - D. Pteronyssinus 1:10 -   -    A Aspergillus fumigatus  1:10 (+) 8/5  -    P Penicillium notatum 1:10 ? 5/5  -     Alternaria alt 1:10 -   -      1:10 -   -  "   Conclusions: no signs for relevant sensitization in IDT. Will observe if any delayed reaction      Fish and Seafood (Placed Jul 19, 2023)??  No Substance Readings (15min) Evaluation   POS Histamine 1mg/ml -    NEG NaCl 0.9% -      No Substance Readings (15min) Evaluation   1 Codfish (gadus morhua) -    2 Flounder (platichthys spp) -    3 Tuna (thunnus albecarus) -    4 Bass (centropristis striata) -    5 Mackerel (scomberomorus cavalla) -    6 Halibut (hipoglossus hipoglossus) -    7 Amity (salmo kamar) -    8 Catfish (ictalurus spp) -    9 Perch (serranus scriba) -    10 [place], Sy (oncorhynchus mykiss) -    11 Crab (callinectes spp) -    12 Lobster (homarus americanus) -    13 Shrimp white/brown/pink (farfantepenaeus&litopenaeus) -    14 Kingcrab (paralithodes camtschatica) -    15 Scallop (placopecten magellanicus) -    16 Clam (mercenaria mercenaria) -    17 Oyster (cstrea/crassostrea) -      Conclusion:    DRUG ALLERGY TEST SERIES Jul 17, 2023)    ANTIBIOTICS    Prick Tests         Substance/ Allergen Conc Result (20 min) Remarks    Bactrim 80/400 mg/ml        Intradermal Tests   immed immed delay delay      Substance Conc 1st dil  2nd dil  2 days  4 days remarks    Bactrim 1:100            Patch Tests  as is as is 1:2 1:2     As Is  Vas Substance Conc 2 days 4 days 2 days 4 days remarks   192 193 Bactrim 80/400 mg/ml              [] No relevant allergic reaction observed    [] Allergic reaction diagnosed against following allergens:      Interpretation/ remarks:   See later    [] Patient information given   [] ACDS CAMP information's (# ....) to following compounds: .....   [] General information's to following compounds: ......      Assessment & Plan:      ==> Final Diagnosis:     # recurrent dermatitis on hand left>right  DDx atopic dermatitis with dyshidrosis and irritant dermatitis  DDx allergic contact dermatitis  DDx tinea hands  * chronic illness with exacerbation, progression, side effects from  treatment    # atopic predisposition with    Seasonal RC (proven by Dr. Peck)    Dyshidrotic hand dermatitis  * chronic illness with exacerbation, progression, side effects from treatment      These conclusions are made at the best of one's knowledge and belief based on the provided evidence such as patient's history and allergy test results and they can change over time or can be incomplete because of missing information's.    ==> Treatment Plan:  >> fungal culture today and if filamentous fungi, then treat orally with Lamisil  >> plan patch and partial prick tests for molds and dust mites and maybe IDT    ___________________________    Staff: : provider    Follow-up in Derm-Allergy clinic for 2nd readings and final conclusions after 4 days   ___________________________    I spent a total of 17 minutes with Vicky Carlson during today s  visit. This time was spent discussing all the individual test results, correlating them to the clinical relevance, counseling the patient and/or coordinating care

## 2023-07-19 NOTE — LETTER
7/19/2023         RE: Vicky Carlson  [address] [place] [address]        Dear Colleague,    Thank you for referring your patient, Vicky Carlson, to the [facility] [place]. Please see a copy of my visit note below.    [facility] Dermato-allergology Note  Office visit  Encounter Date: Jul 19, 2023  ____________________________________________    CC: No chief complaint on file.      HPI:  (Jul 19, 2023)  Ms. Vicky Carlson is a(n) 62 year old female who presents today as a return patient for allergy consultation  - Follow-up in Derm-Allergy clinic for 1st readings of patch tests after 2 days    - otherwise feeling well in usual state of health    Physical exam:  General: In no acute distress, well-developed, well-nourished  Eyes: no conjunctivitis  ENT: no signs of rhinitis   Pulmonary: no wheezing or coughing  Skin:Focused examination of the skin on test sites was performed = see test results below    Earlier History and Allergy exams:  (Jul 17, 2023)  - Follow-up in Derm-Allergy clinic for allergy tests as planned  - Patient has about 7cm mass in the lung and it seems to shrink ==> maybe infectious    Earlier History and Allergy exams:  (Jun 13, 2023)  - Had in 2014 allergy testing with Dr. Peck and found pollen allergies then.   - since 2017 on back of hands first and then also palmar recurrent dermatitis left hand >> right hand  - one time had eczema below the left eye, otherwise other body areas not affected  >> Betamethasone ointment seems to make it worse  >> Elidel helped for some time      Past Medical History:   There is no problem list on file for this patient.    No past medical history on file.    Allergies:  Allergies   Allergen Reactions     Cats Other (See Comments)     Throat gets itchy/swelling      Seasonal Allergies Other (See Comments)     Matted swollen eyes.      Shellfish Allergy Other (See Comments) and Itching     Raw/fresh  shrimp-oysters have caused eye swelling and throat itching.  Inconsistent.     Sulfa Antibiotics Unknown     Omeprazole Rash       Medications:  Current Outpatient Medications   Medication     albuterol (VENTOLIN HFA) 108 (90 Base) MCG/ACT inhaler     cholecalciferol 125 MCG (5000 UT) CAPS     Cyanocobalamin 5000 MCG TBDP     fish oil-omega-3 fatty acids (OMEGA-3 FISH OIL) 1000 MG capsule     fluticasone (FLONASE) 50 MCG/ACT nasal spray     levothyroxine (SYNTHROID/LEVOTHROID) 125 MCG tablet     multivitamin w/minerals (THERA-VIT-M) tablet     pimecrolimus (ELIDEL) 1 % external cream     sodium chloride (OCEAN) 0.65 % nasal spray     vitamin C (ASCORBIC ACID) 1000 MG TABS     No current facility-administered medications for this visit.       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 Patient has the following hobbies or non-occupational exposure: golfing, paper crafts  Doesn't wear gloves really    Family History:  No family history on file.    Previous Labs, Allergy Tests, Dermatopathology, Imaging:  none    Referred By: Alex Peck MD  [address] [place] [address]     Allergy Tests:    Past Allergy Test    Order for Future Allergy Testing:    [] Outpatient  [] Inpatient: [facility]..../ Bed ....       Skin Atopy (atopic dermatitis) [] Yes   [x] No ..as child on feet dermatitis  Contact allergies:   [] Yes   [x] No ..........  Hand eczema:   [x] Yes   [] No recurrent dermatitis on back of hands and palmar left>right           Leading hand:   [x] R   [] L       [] Ambidextrous         Drug allergies:        [] Yes   [x] No  which?......    Urticaria/Angioedema  [] Yes   [] No .........  Food Allergy:  [x] Yes   [] No  Which?.if eats a lot of almonds get itchy in the mouth and soarness, shellfish like crab got stuffed in nose and mouth (shrimp/oyster no problems)   Pets :  [] Yes   [x] No  Which?..reacts to dogs and cats.         [x]  Rhinitis   [x] Conjunctivitis   [x] Sinusitis    [] Polyposis   [] Otitis   [] Pharyngitis         []  Postnasal drip    []  none  Operations:   [] Tonsils   [] Septum   [] Sinus   [] Polyposis        [] Asthma bronchiale   [] Coughing      [x]  none  Symptoms (mostly Rhinoconjunctivitis and Asthma) aggravated by:  Season   [] I   [] II   [x] III   [x] IV   []V   []VI   []VII   [x]VIII   [x]IX   []X   []XI   []XII     [] perennial   Day time      [] morning   [] noon      [] evening        [] night    [] whole day........  []  none  Location/changes    [] inside        [] outside   [] mountains    [] sea     [] others.............   []  none  Triggers, specific     [] animals     [] plants     [] dust              [] others ...........................    []  none  Triggers, others       [] work          [] psyche    [] sport            [] others .............................  []  none  Irritant                [] phys efforts [] smoke    [] heat/cold     [] odors  []others............... []  none    Order for PATCH TESTS  Reason for tests (suspected allergy): recurrent hand dermatitis  Known previous allergies: none  Standardized panels  [x] Standard panel (40 tests)  [x] Preservatives & Antimicrobials (31 tests)  [x] Emulsifiers & Additives (25 tests)   [x] Perfumes/Flavours & Plants (25 tests)  [x] Hairdresser panel (12 tests)  [] Rubber Chemicals (22 tests)  [x] Plastics (26 tests)  [] Colorants/Dyes/Food additives (20 tests)  [] Metals (implants/dental) (24 tests)  [] Local anaesthetics/NSAIDs (13 tests)  [x] Antibiotics & Antimycotics (14 tests)   [x] Corticosteroids (15 tests)   [] Photopatch test (62 tests)   [] others: ...      [x] Patient's own products: Betamethasone    DO NOT test if chemical or biological identity is unknown!     always ask from patient the product information and safety sheets (MSDS)       Order for PRICK TESTS    Reason for tests (suspected allergy): not necessary  Known previous allergies: seasonal RC tested with   Liv    Standardized prick panels  [x] Atopic panel (20 tests) maybe only first 8 for pricks and if neg do IDT with delayed readings  [] Pediatric Panel (12 tests)  [] Milk, Meat, Eggs, Grains (20 tests)   [] Dust, Epithelia, Feathers (10 tests)  [x] Fish, Seafood, Shellfish (17 tests)  [] Nuts, Beans (14 tests)  [] Spice, Vegetable, Fruit (17 tests)  [] Pollen Panel = Tree, Grass, Weed (24 tests)  [] Others: ...      [] Patient's own products: ...    DO NOT test if chemical or biological identity is unknown!     always ask from patient the product information and safety sheets (MSDS)     Standardized intradermal tests  [] Penicillium notatum [] Aspergillus fumigatus [] House dust mites D.far & D. pteron  [] Cat    [] dog  [] Others: ...  [] Bee venom   [] Wasp venom  !!Specific protocol with dilutions!!       Order for Drug allergy tests (prick & Intradermal & patch tests)    [] Penicillin G  [] Ampicillin [] Cefazolin   [] Ceftriaxone   [] Ceftazidime  [x] Bactrim    [] Others: ...  Order for ... as test date    ________________________________    RESULTS & EVALUATION of PATCH TESTS    Jul 17, 2023 application of patch tests:    Patch test readings after     [x] 2 days, [] 3 days [x] 4 days, [] 5 days,  Other duration: ...      STANDARD Series                                          # Substance 2 days 4 days remarks     1 Cesar Mix [C] - -       2 Colophony - -       3  2-Mercaptobenzothiazole  - -       4 Methylisothiazolinone +/++ -       5 Carba Mix - -       6 Thiuram Mix [A] - -       7 Bisphenol A Epoxy Resin - -       8 T-Arvw-Risywcveekj-Formaldehyde Resin - -       9 Mercapto Mix [A] - -       10 Black Rubber Mix- PPD [B] - -       11 Potassium Dichromate  -  -       12 Balsam of [place] (Myroxylon Pereirae Resin) - -       13 Nickel Sulphate Hexahydrate - -       14 Mixed Dialkyl Thiourea - -       15 Paraben Mix [B] - -       16 Methyldibromo Glutaronitrile + -       17 Fragrance Mix 8% + -       18  2-Bromo-2-Nitropropane-1,3-Diol (Bronopol) CT - -       19 Lyral - -       20 Tixocortol-21- Pivalate CT - -       21 Diazolidinyl urea (Germall II) - -        22 Methyl Methacrylate - -       23 Cobalt (II) Chloride Hexahydrate - -       24 Fragrance Mix II  - -       25 Compositae Mix - -       26 Benzoyl Peroxide - -       27 Bacitracin - -       28 Formaldehyde - -       29 Methylchloroisothiazolinone / Methylisothiazolinone - -       30 Corticosteroid Mix CT - -       31 Sodium Lauryl Sulfate - -       32 Lanolin Alcohol - -       33 Turpentine - -       34 Cetylstearylalcohol - -       35 Chlorhexidine Dicluconate - -       36 Budesonide - -       37 Imidazolidinyl Urea  - -       38 Ethyl-2 Cyanoacrylate - -       39 Quaternium 15 (Dowicil 200) - -       40 Decyl Glucoside - -       PRESERVATIVES & ANTIMICROBIALS        # Substance 2 days 4 days remarks   41 1  1,2-Benzisothiazoline-3-One, Sodium Salt - -     2  1,3,5-Ryder (2-Hydroxyethyl) - Hexahydrotriazine (Grotan BK) - -     3 3-Gooauihbugxxs-6-Nitro-1, 3-Propanediol - -     4  3, 4, 4' - Triclocarban - -    45 5 4 - Chloro - 3 - Cresol - -     6 4 - Chloro - 4 - Xylenol (PCMX) - -     7 7-Ethylbicyclooxazolidine (Bioban [id number]) - -     8 Benzalkonium Chloride CT - -     9 Benzyl Alcohol - -    50 10 Cetalkonium Chloride - -     11 Cetylpyrimidine Chloride  - -     12 Chloroacetamide - -     13 DMDM Hydantoin - -     14 Glutaraldehyde - -    55 15 Triclosan - -     16 Glyoxal Trimeric Dihydrate - -     17 Iodopropynyl Butylcarbamate - -     18 Octylisothiazoline - -     19 Bithionol CT - -    60 20 Bioban P 1487 (Nitrobutyl) Morpholine/(Ethylnitro-Trimethylene) Dimorpholine - -     21 Phenoxyethanol - -     22 Phenyl Salicylate - -     23 Povidone Iodine - -     24 Sodium Benzoate - -    65 25 Sodium Disulfite - -     26 Sorbic Acid - -     27 Thimerosal - -     28 Melamine Formaldehyde Resin - -     29 Ethylenediamine Dihydrochloride - -      Parabens       70 30 Butyl-P-Hydroxybenzoate - -     31 Ethyl-P-Hydroxybenzoate - -     32 Methyl-P-Hydroxybenzoate - -    73 33 Propyl-P-Hydroxybenzoate - -      EMULSIFIERS & ADDITIVES       # Substance 2 days 4 days remarks   74 1 Polyethylene Glycol-400 - -    75 2 Cocamidopropyl Betaine - -     3 Amerchol L101 - -     4 Propylene Glycol - -     5 Triethanolamine - -     6 Sorbitane Sesquiolate CT - -    80 7 Isopropylmyristate - -     8 Polysorbate 80 CT - -     9 Amidoamine   (Stearamidopropyl Dimethylamine) - -     10 Oleamidopropyl Dimethylamine - -     11 Lauryl Glucoside - -    85 12 Coconut Diethanolamide  - -     13 2-Hydroxy-4-Methoxy Benzophenone (Oxybenzone) - -     14 Benzophenone-4 (Sulisobenzon) - -     15 Propolis - -     16 Dexpanthenol - -    90 17 Abitol - -     18 Tert-Butylhydroquinone - -     19 Benzyl Salicylate - -     20 Dimethylaminopropylamin (DMPA) - -     21 Zinc Pyrithione (Zinc Omadine)  - -    95 22 Ryder(Hydroxymethyl) Nitromethane  - -      Antioxidant       23 Dodecyl Gallate - -     24 Butylhydroxyanisole (BHA) - -     25 Butylhydroxytoluene (BHT) - -     26 Di-Alpha-Tocopherol (Vit E) - -    100 27 Propyl Gallate - -      PERFUMES, FLAVORS & PLANTS        # Substance 2 days 4 days remarks   101 1 Benzyl Cinnamate - -     2 Di-Limonene (Dipentene) - -     3 Cananga Odorata (Raffaele Castorena) (I) - -     4 Lichen Acid Mix - -    105 5 Mentha Piperita Oil (Peppermint Oil) - -     6 Sesquiterpenelactone mix - -     7 Tea Tree Oil, Oxidized - -     8 Wood Tar Mix (+) -     9 Abietic Acid - -    110 10 Lavendula Angustifolia Oil (Lavender Oil) - -     11 Fragrance mix II CT * 14% - -      Fragrance Mix I       12 Oakmoss Absolute - -     13 Eugenol - -     14 Geraniol - -    115 15 Hydroxycitronellal - -     16 Isoeugenol - -     17 Cinnamic Aldehyde - -     18 Cinnamic Alcohol  - -      Fragrance mix II       19 Citronellol - -    120 20 Alpha-Hexylcinnamic Aldehyde    - -     21 Citral - -     22  Farnesol - -    123 23 Coumarin - -      Hexylcinnamic aldehyde, Coumarin, Farnesol, Hydroxyisohexy3-cyclohexene carboxaldehyde, citral, citrolellol  HAIRDRESSER        # Substance 2 days 4 days remarks   124 1 P-Phenylenediamine -  -    125 2 P-Toluenediamine Sulphate  -  -     3 Ammonium Thioglycolate - -     4 Ammonium Persulfate - -     5 Resorcinol - -     6 3-Aminophenol - -    130 7 P-Aminophenol - -     8 Glyceryl Monothioglycolate - -    132 9 Pyrogallol - -      PLASTICS (Acrylates/Epoxy Systems)       # Substance 2 days 4 days remarks     Acrylates - -    133 1 2-Hydroxyethyl Methacrylate (HEMA) - -    134 2 1,4-Butandioldimethacrylate (BUDMA) - -     3  2-Ethylhexyl Acrylate - -     4 Bisphenol-A-Dimethacrylate  - -     5 Diurethane-Dimethacrylate - -     6 Ethyleneglycoldimethacrylate (EGDMA) - -     7 Pentaerythritoltriacrylate (AYE) - -    140 8 Triethylene Glycol Dimethacrylate (TEGDMA) - -      Synthetic material/additives        9 R-Pupq-Sbppuzfxoda - -     10 Tricresyl Phosphate - -     11 9-Qzob-Nnvtznuorsclm - -     12 Dioctyl phtalate(DEHP,DOP) / (Dimethylhexylphthalate)  - -    145 13 Dibutylphthalate - -     14 Dimethylphthalate - -     15 Toluene-2,4-Diisocyanate - -     16 Diphenylmethane-4,4''-Diisocyanate - -      EPOXY RESIN SYSTEMS        Reactive Solvents - -     17 Cresyl Glycidyl Ether - -    150 18 Butyl Glycidyl Ether - -     19 Phenyl Glycidyl Ether - -     20 1,4-Butanediol Diglycidyl Ether - -     21 1,6-Hexanediole Diglycidyl Ether - -      Hardener / Accelerator - -     22 Triethylenetetramine - -    155 23 Diethylenetriamine - -     24 Isophorone Diamine (IPD) - -     25 N,N-Dimethyl-P-Toluidine - -    158 26 Isobornyl Acrylate - -      ANTIBIOTICS & ANTIMYCOTICS    # Substance 2 days 4 days remarks   159 1 Erythromycin - -    160 2 Framycetin Sulfate - -     3 Fusidic Acid Sodium Salt - -     4 Gentamicin Sulfate - -     5 Neomycin Sulfate - -     6 Oxytetracycline  - -     165 7 Polymyxin B Sulfate - -     8 Tetracycline-HCL - -     9 Sulfanilamide - -     10 Metronidazole - -     11 Nitrofurazone - -    170 12 Nystatin - -     13 Clotrimazole - -     14 Clioquinol 5% - -     15 Miconazole  - -    174 16 Tobramycine           CORTICOSTEROIDS   # Substance 2 days 4 days remarks Allergy  class   175 1 Amcinonide - -  B    2 Betametasone-17,21 Dipropionate - -  D1    3 Desoximetasone - -  C    4 Betamethasone-17-Valerate - -  D1    5 Dexamethasone - -  C   180 6 Hydrocortisone - -  A    7 Clobetasol-17-Propionate - -  D1    8 Dexamethasone-21-Phosphate Disodium Salt - -  C    9 Hydrocortisone-17 Butyrate - -  D2    10 Prednisolone - -  A   185 11 Triamcinolone Acetonide - -  B    12 Methylprednisolone Aceponate - -  D2    13 Hydrocortisone-21-Acetate - -  A   188 14 Prednicarbate - -  D2     Group Characteristics of group Generic name Name  cross reactions   A Hydrocortisone   Cloprednole, Fludrocortisone acétate, Hydrocortison acetate, Methylprednisolone, Prednisolone, Tixocortolpivalate Alfacortone, Fucidin H, Dermacalm, Hexacortone, Premandole, Imacort With group D2   B Triamcinolone-acetonide   Budenoside (R-isomer), Amcinonide, Desonide, Fluocinolone acetonide, Triamcinolone acetonide Locapred, Locatop  Synalar, Pevisone, Ken[place]ort -   C Betamethasone (Without Nieves)   Betamethasone, Dexamethasone, Flumethasone pivalate, Halomethasone Daivobet, Dexasalyl, Locasalen,   -   D1 Betamethasone-diproprionate   Betamethasone dipropionate, Betamethasone-17-valerate, Clobetasole-propionate, Fluticasone propionate, Mometasone furoate Betnovate, Diprogenta, Diprosalic, Diprosone, Celestoderm, Fucicort,  Cutivate, Axotide, Elocom -   D2 Methylprednisolone-aceponate   Hydrocortisone-aceponate, Hydrocortisone-buteprate, Hydrocortisone-17-butyrate, Methylprednisolone aceponate, Prednicarbate Locoïd, Advantan,  Prednitop With group A and Budesonide (S-isomer)       OTHER PRODUCTS        #  "Substance Conc  % solv 2 days 4 days remarks   189 1 betamethasone As is       190 2 Levothyroxin As is       191 3 Levothyroxin 50 vas        Patients own products:  è DO NOT test if chemical or biological identity is unknown!   - always ask from patient the product information and safety sheets and consult with the physician   - check neutral pH with pH indicator paper (do not test pH below 5 or over 8 or consult with physician)  - leave-on cosmetics can be tested \"as is\"  - rinse-off products have to be diluted with water, buffer, olive oil or paraffin (discuss with physician)  à remember:   - non-specific toxic/corrosive or biological reactions can occur  - tests with patients own products are not standardized and test conditions are not optimized for patch tests. Whenever possible test with standardized test series and correlate use of product with the result of the patch tests  - if not sure if compounds can be tested under occlusion in patch tests consider open application tests      Results of patch tests:                         Interpretation:  - Negative                    A    = Allergic      (+) Erythema    TI   = Toxic/irritant   + E + Infiltration    RaP = Relevance at Present     ++ E/I + Papulovesicle   Rpr  = Relevance Previously     +++ E/I/P + Blister     nR   = No Relevance      Atopy Screen (Placed Jul 17, 2023)  No Substance Readings (15 min) Evaluation   POS Histamine 1mg/ml -    NEG NaCl 0.9% -      No Substance Readings (15 min) Evaluation   1 Alternaria alternata (tenuis)  -    2 Cladosporium herbarum -    3 Aspergillus fumigatus -    4 Penicillium notatum -    5 Dermatophagoides pteronyssinus -    6 Dermatophagoides farinae -    7 Dog epithelium (canis spp) -    8 Cat hair (jose catus) -    Conclusion      Intradermal Testing (Placed Jul 17, 2023)  No Substance Conc.  Reading (15min)  immediate Papule [mm] / Erythema [mm] Reading   (... days)  delayed Papule [mm] / Erythema [mm] Remarks "   DF Standard Dust Mite - D. Farinae 1:10 -   -    DP Standard Dust Mite - D. Pteronyssinus 1:10 -   -    A Aspergillus fumigatus  1:10 (+) 8/5  -    P Penicillium notatum 1:10 ? 5/5  -     Alternaria alt 1:10 -   -      1:10 -   -    Conclusions: no signs for relevant sensitization in IDT. Will observe if any delayed reaction      Fish and Seafood (Placed Jul 19, 2023)??  No Substance Readings (15min) Evaluation   POS Histamine 1mg/ml -    NEG NaCl 0.9% -      No Substance Readings (15min) Evaluation   1 Codfish (gadus morhua) -    2 Flounder (platichthys spp) -    3 Tuna (thunnus albecarus) -    4 Bass (centropristis striata) -    5 Mackerel (scomberomorus cavalla) -    6 Halibut (hipoglossus hipoglossus) -    7 [place] (salmo kamar) -    8 Catfish (ictalurus spp) -    9 Perch (serranus scriba) -    10 [place], Sy (oncorhynchus mykiss) -    11 Crab (callinectes spp) -    12 Lobster (homarus americanus) -    13 Shrimp white/brown/pink (farfantepenaeus&litopenaeus) -    14 Kingcrab (paralithodes camtschatica) -    15 Scallop (placopecten magellanicus) -    16 Clam (mercenaria mercenaria) -    17 Oyster (cstrea/crassostrea) -      Conclusion:    DRUG ALLERGY TEST SERIES Jul 17, 2023)    ANTIBIOTICS    Prick Tests         Substance/ Allergen Conc Result (20 min) Remarks    Bactrim 80/400 mg/ml        Intradermal Tests   immed immed delay delay      Substance Conc 1st dil  2nd dil  2 days  4 days remarks    Bactrim 1:100            Patch Tests  as is as is 1:2 1:2     As Is  Vas Substance Conc 2 days 4 days 2 days 4 days remarks   192 193 Bactrim 80/400 mg/ml              [] No relevant allergic reaction observed    [] Allergic reaction diagnosed against following allergens:      Interpretation/ remarks:   See later    [] Patient information given   [] ACDS CAMP information's (# ....) to following compounds: .....   [] General information's to following compounds: ......      Assessment & Plan:      ==> Final Diagnosis:      # recurrent dermatitis on hand left>right  DDx atopic dermatitis with dyshidrosis and irritant dermatitis  DDx allergic contact dermatitis  DDx tinea hands  * chronic illness with exacerbation, progression, side effects from treatment    # atopic predisposition with    Seasonal RC (proven by Dr. Peck)    Dyshidrotic hand dermatitis  * chronic illness with exacerbation, progression, side effects from treatment      These conclusions are made at the best of one's knowledge and belief based on the provided evidence such as patient's history and allergy test results and they can change over time or can be incomplete because of missing information's.    ==> Treatment Plan:  >> fungal culture today and if filamentous fungi, then treat orally with Lamisil  >> plan patch and partial prick tests for molds and dust mites and maybe IDT    ___________________________    Staff: : provider    Follow-up in Derm-Allergy clinic for 2nd readings and final conclusions after 4 days   ___________________________    I spent a total of 17 minutes with Vicky Carlson during today s  visit. This time was spent discussing all the individual test results, correlating them to the clinical relevance, counseling the patient and/or coordinating care         Again, thank you for allowing me to participate in the care of your patient.        Sincerely,        Jeff Wild MD

## 2023-07-20 NOTE — PROGRESS NOTES
Formerly [facility] Dermato-allergology Note  Office visit  Encounter Date: Jul 21, 2023  ____________________________________________    CC: No chief complaint on file.      HPI:  (Jul 21, 2023)  Ms. Vicky Carlson is a(n) 62 year old female who presents today as a return patient for allergy consultation  - Follow-up in Derm-Allergy clinic for 2nd readings and final conclusions after 4 days   - otherwise feeling well in usual state of health    Physical exam:  General: In no acute distress, well-developed, well-nourished  Eyes: no conjunctivitis  ENT: no signs of rhinitis   Pulmonary: no wheezing or coughing  Skin:Focused examination of the skin on test sites was performed = see test results below    Earlier History and Allergy exams:  (Jul 19, 2023)  - Follow-up in Derm-Allergy clinic for 1st readings of patch tests after 2 days      Earlier History and Allergy exams:  (Jul 17, 2023)  - Follow-up in Derm-Allergy clinic for allergy tests as planned  - Patient has mass in the lung and it seems to shrink ==> maybe infectious    Earlier History and Allergy exams:  (Jun 13, 2023)  - Had in 2014 allergy testing with Dr. Peck and found pollen allergies then.   - since 2017 on back of hands first and then also palmar recurrent dermatitis left hand >> right hand  - one time had eczema below the left eye, otherwise other body areas not affected  >> Betamethasone ointment seems to make it worse  >> Elidel helped for some time      Past Medical History:   There is no problem list on file for this patient.    No past medical history on file.    Allergies:  Allergies   Allergen Reactions     Cats Other (See Comments)     Throat gets itchy/swelling      Seasonal Allergies Other (See Comments)     Matted swollen eyes.      Shellfish Allergy Other (See Comments) and Itching     Raw/fresh shrimp-oysters have caused eye swelling and throat itching.  Inconsistent.     Sulfa Antibiotics Unknown     Omeprazole Rash        Medications:  Current Outpatient Medications   Medication     albuterol (VENTOLIN HFA) 108 (90 Base) MCG/ACT inhaler     cholecalciferol 125 MCG (5000 UT) CAPS     Cyanocobalamin 5000 MCG TBDP     fish oil-omega-3 fatty acids (OMEGA-3 FISH OIL) 1000 MG capsule     fluticasone (FLONASE) 50 MCG/ACT nasal spray     levothyroxine (SYNTHROID/LEVOTHROID) 125 MCG tablet     multivitamin w/minerals (THERA-VIT-M) tablet     pimecrolimus (ELIDEL) 1 % external cream     sodium chloride (OCEAN) 0.65 % nasal spray     vitamin C (ASCORBIC ACID) 1000 MG TABS     No current facility-administered medications for this visit.       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 Patient has the following hobbies or non-occupational exposure: golfing, paper crafts  Doesn't wear gloves really    Family History:  No family history on file.    Previous Labs, Allergy Tests, Dermatopathology, Imaging:  none    Referred By: Alex Peck MD  [address] [place] [address]     Allergy Tests:    Past Allergy Test    Order for Future Allergy Testing:    [] Outpatient  [] Inpatient: [facility]..../ Bed ....       Skin Atopy (atopic dermatitis) [] Yes   [x] No ..as child on feet dermatitis  Contact allergies:   [] Yes   [x] No ..........  Hand eczema:   [x] Yes   [] No recurrent dermatitis on back of hands and palmar left>right           Leading hand:   [x] R   [] L       [] Ambidextrous         Drug allergies:        [] Yes   [x] No  which?......    Urticaria/Angioedema  [] Yes   [] No .........  Food Allergy:  [x] Yes   [] No  Which?.if eats a lot of almonds get itchy in the mouth and soarness, shellfish like crab got stuffed in nose and mouth (shrimp/oyster no problems)   Pets :  [] Yes   [x] No  Which?..reacts to dogs and cats.         [x]  Rhinitis   [x] Conjunctivitis   [x] Sinusitis   [] Polyposis   [] Otitis   [] Pharyngitis         []  Postnasal drip    []  none  Operations:   [] Tonsils   [] Septum   []  Sinus   [] Polyposis        [] Asthma bronchiale   [] Coughing      [x]  none  Symptoms (mostly Rhinoconjunctivitis and Asthma) aggravated by:  Season   [] I   [] II   [x] III   [x] IV   []V   []VI   []VII   [x]VIII   [x]IX   []X   []XI   []XII     [] perennial   Day time      [] morning   [] noon      [] evening        [] night    [] whole day........  []  none  Location/changes    [] inside        [] outside   [] mountains    [] sea     [] others.............   []  none  Triggers, specific     [] animals     [] plants     [] dust              [] others ...........................    []  none  Triggers, others       [] work          [] psyche    [] sport            [] others .............................  []  none  Irritant                [] phys efforts [] smoke    [] heat/cold     [] odors  []others............... []  none    Order for PATCH TESTS  Reason for tests (suspected allergy): recurrent hand dermatitis  Known previous allergies: none  Standardized panels  [x] Standard panel (40 tests)  [x] Preservatives & Antimicrobials (31 tests)  [x] Emulsifiers & Additives (25 tests)   [x] Perfumes/Flavours & Plants (25 tests)  [x] Hairdresser panel (12 tests)  [] Rubber Chemicals (22 tests)  [x] Plastics (26 tests)  [] Colorants/Dyes/Food additives (20 tests)  [] Metals (implants/dental) (24 tests)  [] Local anaesthetics/NSAIDs (13 tests)  [x] Antibiotics & Antimycotics (14 tests)   [x] Corticosteroids (15 tests)   [] Photopatch test (62 tests)   [] others: ...      [x] Patient's own products: Betamethasone    DO NOT test if chemical or biological identity is unknown!     always ask from patient the product information and safety sheets (MSDS)       Order for PRICK TESTS    Reason for tests (suspected allergy): not necessary  Known previous allergies: seasonal RC tested with Dr. Peck    Standardized prick panels  [x] Atopic panel (20 tests) maybe only first 8 for pricks and if neg do IDT with delayed readings  []  Pediatric Panel (12 tests)  [] Milk, Meat, Eggs, Grains (20 tests)   [] Dust, Epithelia, Feathers (10 tests)  [x] Fish, Seafood, Shellfish (17 tests)  [] Nuts, Beans (14 tests)  [] Spice, Vegetable, Fruit (17 tests)  [] Pollen Panel = Tree, Grass, Weed (24 tests)  [] Others: ...      [] Patient's own products: ...    DO NOT test if chemical or biological identity is unknown!     always ask from patient the product information and safety sheets (MSDS)     Standardized intradermal tests  [] Penicillium notatum [] Aspergillus fumigatus [] House dust mites D.far & D. pteron  [] Cat    [] dog  [] Others: ...  [] Bee venom   [] Wasp venom  !!Specific protocol with dilutions!!       Order for Drug allergy tests (prick & Intradermal & patch tests)    [] Penicillin G  [] Ampicillin [] Cefazolin   [] Ceftriaxone   [] Ceftazidime  [x] Bactrim    [] Others: ...  Order for ... as test date    ________________________________    RESULTS & EVALUATION of PATCH TESTS    Jul 17, 2023 application of patch tests:    Patch test readings after     [x] 2 days, [] 3 days [x] 4 days, [] 5 days,  Other duration: ...      STANDARD Series                                          # Substance 2 days 4 days remarks     1 Cesar Mix [C] - -       2 Colophony - -       3  2-Mercaptobenzothiazole  - -       4 Methylisothiazolinone +/++ ++/+++       5 Carba Mix - -       6 Thiuram Mix [A] - -       7 Bisphenol A Epoxy Resin - -       8 Y-Hmqe-Dszpwvzjkwz-Formaldehyde Resin - -       9 Mercapto Mix [A] - -       10 Black Rubber Mix- PPD [B] - -       11 Potassium Dichromate  -  -       12 Balsam of [place] (Myroxylon Pereirae Resin) - +       13 Nickel Sulphate Hexahydrate - -       14 Mixed Dialkyl Thiourea - -       15 Paraben Mix [B] - -       16 Methyldibromo Glutaronitrile + +/++       17 Fragrance Mix 8% + (+)       18 2-Bromo-2-Nitropropane-1,3-Diol (Bronopol) CT - -       19 Lyral - -       20 Tixocortol-21- Pivalate CT - -       21 Diazolidinyl  urea (Germall II) - -        22 Methyl Methacrylate - -       23 Cobalt (II) Chloride Hexahydrate - -       24 Fragrance Mix II  - -       25 Compositae Mix - -       26 Benzoyl Peroxide - -       27 Bacitracin - -       28 Formaldehyde - -       29 Methylchloroisothiazolinone / Methylisothiazolinone - -       30 Corticosteroid Mix CT - -       31 Sodium Lauryl Sulfate - -       32 Lanolin Alcohol - -       33 Turpentine - -       34 Cetylstearylalcohol - -       35 Chlorhexidine Dicluconate - -       36 Budesonide - -       37 Imidazolidinyl Urea  - -       38 Ethyl-2 Cyanoacrylate - -       39 Quaternium 15 (Dowicil 200) - -       40 Decyl Glucoside - -       PRESERVATIVES & ANTIMICROBIALS        # Substance 2 days 4 days remarks   41 1  1,2-Benzisothiazoline-3-One, Sodium Salt - -     2  1,3,5-Ryder (2-Hydroxyethyl) - Hexahydrotriazine (Grotan BK) - -     3 9-Caisarqtpxhyy-8-Nitro-1, 3-Propanediol - -     4  3, 4, 4' - Triclocarban - -    45 5 4 - Chloro - 3 - Cresol - -     6 4 - Chloro - 4 - Xylenol (PCMX) - -     7 7-Ethylbicyclooxazolidine (Bioban [id number]) - -     8 Benzalkonium Chloride CT - -     9 Benzyl Alcohol - -    50 10 Cetalkonium Chloride - -     11 Cetylpyrimidine Chloride  - -     12 Chloroacetamide - -     13 DMDM Hydantoin - -     14 Glutaraldehyde - -    55 15 Triclosan - -     16 Glyoxal Trimeric Dihydrate - -     17 Iodopropynyl Butylcarbamate - -     18 Octylisothiazoline - -     19 Bithionol CT - -    60 20 Bioban P 1487 (Nitrobutyl) Morpholine/(Ethylnitro-Trimethylene) Dimorpholine - -     21 Phenoxyethanol - -     22 Phenyl Salicylate - -     23 Povidone Iodine - +/++     24 Sodium Benzoate - -    65 25 Sodium Disulfite - -     26 Sorbic Acid - -     27 Thimerosal - -     28 Melamine Formaldehyde Resin - -     29 Ethylenediamine Dihydrochloride - -      Parabens      70 30 Butyl-P-Hydroxybenzoate - -     31 Ethyl-P-Hydroxybenzoate - -     32 Methyl-P-Hydroxybenzoate - -    73 33  Propyl-P-Hydroxybenzoate - -      EMULSIFIERS & ADDITIVES       # Substance 2 days 4 days remarks   74 1 Polyethylene Glycol-400 - -    75 2 Cocamidopropyl Betaine - -     3 Amerchol L101 - -     4 Propylene Glycol - -     5 Triethanolamine - -     6 Sorbitane Sesquiolate CT - -    80 7 Isopropylmyristate - -     8 Polysorbate 80 CT - -     9 Amidoamine   (Stearamidopropyl Dimethylamine) - -     10 Oleamidopropyl Dimethylamine - -     11 Lauryl Glucoside - -    85 12 Coconut Diethanolamide  - -     13 2-Hydroxy-4-Methoxy Benzophenone (Oxybenzone) - -     14 Benzophenone-4 (Sulisobenzon) - -     15 Propolis - -     16 Dexpanthenol - -    90 17 Abitol - -     18 Tert-Butylhydroquinone - -     19 Benzyl Salicylate - -     20 Dimethylaminopropylamin (DMPA) - -     21 Zinc Pyrithione (Zinc Omadine)  - -    95 22 Ryder(Hydroxymethyl) Nitromethane  - -      Antioxidant       23 Dodecyl Gallate - -     24 Butylhydroxyanisole (BHA) - -     25 Butylhydroxytoluene (BHT) - -     26 Di-Alpha-Tocopherol (Vit E) - -    100 27 Propyl Gallate - -      PERFUMES, FLAVORS & PLANTS        # Substance 2 days 4 days remarks   101 1 Benzyl Cinnamate - -     2 Di-Limonene (Dipentene) - -     3 Cananga Odorata (Raffaele Castorena) (I) - -     4 Lichen Acid Mix - -    105 5 Mentha Piperita Oil (Peppermint Oil) - -     6 Sesquiterpenelactone mix - -     7 Tea Tree Oil, Oxidized - -     8 Wood Tar Mix (+) -     9 Abietic Acid - -    110 10 Lavendula Angustifolia Oil (Lavender Oil) - -     11 Fragrance mix II CT * 14% - ?      Fragrance Mix I       12 [place] Absolute - -     13 Eugenol - -     14 Geraniol - -    115 15 Hydroxycitronellal - -     16 Isoeugenol - -     17 Cinnamic Aldehyde - -     18 Cinnamic Alcohol  - -      Fragrance mix II       19 Citronellol - -    120 20 Alpha-Hexylcinnamic Aldehyde    - -     21 Citral - -     22 Farnesol - -    123 23 Coumarin - -      Hexylcinnamic aldehyde, Coumarin, Farnesol, Hydroxyisohexy3-cyclohexene  carboxaldehyde, citral, citrolellol  HAIRDRESSER        # Substance 2 days 4 days remarks   124 1 P-Phenylenediamine -  -    125 2 P-Toluenediamine Sulphate  -  -     3 Ammonium Thioglycolate - -     4 Ammonium Persulfate - -     5 Resorcinol - -     6 3-Aminophenol - -    130 7 P-Aminophenol - -     8 Glyceryl Monothioglycolate - -    132 9 Pyrogallol - -      PLASTICS (Acrylates/Epoxy Systems)       # Substance 2 days 4 days remarks     Acrylates - -    133 1 2-Hydroxyethyl Methacrylate (HEMA) - -    134 2 1,4-Butandioldimethacrylate (BUDMA) - -     3  2-Ethylhexyl Acrylate - -     4 Bisphenol-A-Dimethacrylate  - -     5 Diurethane-Dimethacrylate - -     6 Ethyleneglycoldimethacrylate (EGDMA) - -     7 Pentaerythritoltriacrylate (AYE) - -    140 8 Triethylene Glycol Dimethacrylate (TEGDMA) - -      Synthetic material/additives        9 H-Fnbs-Eygbdwtlseb - -     10 Tricresyl Phosphate - -     11 7-Ovae-Ilzieoclksloh - -     12 Dioctyl phtalate(DEHP,DOP) / (Dimethylhexylphthalate)  - -    145 13 Dibutylphthalate - -     14 Dimethylphthalate - -     15 Toluene-2,4-Diisocyanate - -     16 Diphenylmethane-4,4''-Diisocyanate - -      EPOXY RESIN SYSTEMS        Reactive Solvents - -     17 Cresyl Glycidyl Ether - -    150 18 Butyl Glycidyl Ether - -     19 Phenyl Glycidyl Ether - -     20 1,4-Butanediol Diglycidyl Ether - -     21 1,6-Hexanediole Diglycidyl Ether - -      Hardener / Accelerator - -     22 Triethylenetetramine - -    155 23 Diethylenetriamine - -     24 Isophorone Diamine (IPD) - -     25 N,N-Dimethyl-P-Toluidine - -    158 26 Isobornyl Acrylate - -      ANTIBIOTICS & ANTIMYCOTICS    # Substance 2 days 4 days remarks   159 1 Erythromycin - -    160 2 Framycetin Sulfate - -     3 Fusidic Acid Sodium Salt - -     4 Gentamicin Sulfate - -     5 Neomycin Sulfate - -     6 Oxytetracycline  - -    165 7 Polymyxin B Sulfate - -     8 Tetracycline-HCL - -     9 Sulfanilamide - -     10 Metronidazole - -     11  Nitrofurazone - -    170 12 Nystatin - -     13 Clotrimazole - -     14 Clioquinol 5% - -     15 Miconazole  - -    174 16 Tobramycine  - +        CORTICOSTEROIDS   # Substance 2 days 4 days remarks Allergy  class   175 1 Amcinonide - -  B    2 Betametasone-17,21 Dipropionate - -  D1    3 Desoximetasone - -  C    4 Betamethasone-17-Valerate - -  D1    5 Dexamethasone - -  C   180 6 Hydrocortisone - -  A    7 Clobetasol-17-Propionate - -  D1    8 Dexamethasone-21-Phosphate Disodium Salt - -  C    9 Hydrocortisone-17 Butyrate - -  D2    10 Prednisolone - -  A   185 11 Triamcinolone Acetonide - -  B    12 Methylprednisolone Aceponate - -  D2    13 Hydrocortisone-21-Acetate - -  A   188 14 Prednicarbate - -  D2     Group Characteristics of group Generic name Name  cross reactions   A Hydrocortisone   Cloprednole, Fludrocortisone acétate, Hydrocortison acetate, Methylprednisolone, Prednisolone, Tixocortolpivalate Alfacortone, Fucidin H, Dermacalm, Hexacortone, Premandole, Imacort With group D2   B Triamcinolone-acetonide   Budenoside (R-isomer), Amcinonide, Desonide, Fluocinolone acetonide, Triamcinolone acetonide Locapred, Locatop  Synalar, Pevisone, [place] -   C Betamethasone (Without Nieves)   Betamethasone, Dexamethasone, Flumethasone pivalate, Halomethasone Daivobet, Dexasalyl, Locasalen,   -   D1 Betamethasone-diproprionate   Betamethasone dipropionate, Betamethasone-17-valerate, Clobetasole-propionate, Fluticasone propionate, Mometasone furoate Betnovate, Diprogenta, Diprosalic, Diprosone, Celestoderm, Fucicort,  Cutivate, Axotide, Elocom -   D2 Methylprednisolone-aceponate   Hydrocortisone-aceponate, Hydrocortisone-buteprate, Hydrocortisone-17-butyrate, Methylprednisolone aceponate, Prednicarbate Locoïd, Advantan,  Prednitop With group A and Budesonide (S-isomer)       OTHER PRODUCTS        # Substance Conc  % solv 2 days 4 days remarks   189 1 betamethasone As is       190 2 Levothyroxin As is       191 3  Levothyroxin 50 vas            Results of patch tests:                         Interpretation:  - Negative                    A    = Allergic      (+) Erythema    TI   = Toxic/irritant   + E + Infiltration    RaP = Relevance at Present     ++ E/I + Papulovesicle   Rpr  = Relevance Previously     +++ E/I/P + Blister     nR   = No Relevance      Atopy Screen (Placed Jul 17, 2023)  No Substance Readings (15 min) Evaluation   POS Histamine 1mg/ml -    NEG NaCl 0.9% -      No Substance Readings (15 min) Evaluation   1 Alternaria alternata (tenuis)  -    2 Cladosporium herbarum -    3 Aspergillus fumigatus -    4 Penicillium notatum -    5 Dermatophagoides pteronyssinus -    6 Dermatophagoides farinae -    7 Dog epithelium (canis spp) -    8 Cat hair (jose catus) -    Conclusion      Intradermal Testing (Placed Jul 17, 2023)  No Substance Conc.  Reading (15min)  immediate Papule [mm] / Erythema [mm] Reading   (... days)  delayed Papule [mm] / Erythema [mm] Remarks   DF Standard Dust Mite - D. Farinae 1:10 -   -    DP Standard Dust Mite - D. Pteronyssinus 1:10 -   -    A Aspergillus fumigatus  1:10 (+) 8/5  -    P Penicillium notatum 1:10 ? 5/5  -     Alternaria alt 1:10 -   -      1:10 -   -    Conclusions: no signs for relevant sensitization in IDT. Will observe if any delayed reaction      Fish and Seafood (Placed Jul 19, 2023)??  No Substance Readings (15min) Evaluation   POS Histamine 1mg/ml -    NEG NaCl 0.9% -      No Substance Readings (15min) Evaluation   1 Codfish (gadus morhua) -    2 Flounder (platichthys spp) -    3 Tuna (thunnus albecarus) -    4 Bass (centropristis striata) -    5 Mackerel (scomberomorus cavalla) -    6 Halibut (hipoglossus hipoglossus) -    7 [place] (salmo kamar) -    8 Catfish (ictalurus spp) -    9 Perch (serranus scriba) -    10 [place], Sy (oncorhynchus mykiss) -    11 Crab (callinectes spp) -    12 Lobster (homarus americanus) -    13 Shrimp white/brown/pink  (farfantepenaeus&litopenaeus) -    14 Kingcrab (paralithodes camtschatica) -    15 Scallop (placopecttabitha serranollanicus) -    16 Clam (mercenaria mercenaria) -    17 Oyster (cstrea/crassostrea) -      Conclusion:    DRUG ALLERGY TEST SERIES Jul 17, 2023)    ANTIBIOTICS    Prick Tests         Substance/ Allergen Conc Result (20 min) Remarks    Bactrim 80/400 mg/ml        Intradermal Tests   immed immed delay delay      Substance Conc 1st dil  2nd dil  2 days  4 days remarks    Bactrim 1:100            Patch Tests  as is as is 1:2 1:2     As Is  Vas Substance Conc 2 days 4 days 2 days 4 days remarks   192 193 Bactrim 80/400 mg/ml -  -         [] No relevant allergic reaction observed    [x] Allergic reaction diagnosed against following allergens:    Preservatives: ++/+++ Methylisothiazlinone, +/++ Methyldibromo Glutaronitrile  Flavor/fragrance: + Balsam of peru  Disinfectants/antibiotics: +/++ PVP Iodine, + Tobramycine      Interpretation/ remarks:   Patient is certainly atopic with hyperirritable, dry skin particularly at work as a . However, there is a very relevant sensitization to common preservatives (Chlormethylisothiazolinone > Methyldibromo Glutaronitrile) and less to fragrances (balsam of [place]). In addition, some reactions to the disinfectant PVP Iodine (e.g. Betadine) and topical antibiotic Tobramycine.  No signs for specific reaction to the sulfonamide Bactrim and the Levothyroxine.    [x] Patient information given   [x] ACDS CAMP information's (# [id number], and [id number]) to following  compounds: Methylisothiazlinone,  Methyldibromo Glutaronitrile, Balsam of peru, PVP  Iodine, Tobramycine   [] General information's to following compounds: ......      Assessment & Plan:      ==> Final Diagnosis:     # recurrent dermatitis on hand left>right  >> proven sensitization to common preservatives (MCI and Methyldibromo Glutaronitrile)  > atopic with dyshidrosis?  > no signs for tinea manuum in fungal  culture  * chronic illness with exacerbation, progression, side effects from treatment    # atopic predisposition with    Seasonal RC (proven by Dr. Peck)    Dyshidrotic hand dermatitis  * chronic illness with exacerbation, progression, side effects from treatment      No signs in skin tests (prick, intradermal and patch) for specific drug allergy to the Sulfonamide Bactrim  ==> if patient needs these medications, they can be used. Keep patient 30min under observation in clinic for first, initial dose.     These conclusions are made at the best of one's knowledge and belief based on the provided evidence such as patient's history and allergy test results and they can change over time or can be incomplete because of missing information's.    ==> Treatment Plan:    >> follow the recommendations of the CAMP and use only topical products at work and private from the Pavithra    >> on the hands next to regular moisturizing use 2xdaily Protopic ointment (instead of Elidel) from Monday to Friday and Weekends steroids (e.g. Triamcinolone or Mometasone)  ___________________________    Staff: : provider    Follow-up in Derm-Allergy clinic if necessary  ___________________________    I spent a total of 35 minutes with Vicky Carlson during today s  visit. This time was spent discussing all the individual test results, correlating them to the clinical relevance, counseling the patient and/or coordinating care. Moreover time was spent to install and explain the CAMP [facility] from [employer] with the informations on the individual allergens and propositions of products that can be used. Please see Assessment and Plan for additional details.

## 2023-07-21 ENCOUNTER — OFFICE VISIT (OUTPATIENT)
Dept: ALLERGY | Facility: CLINIC | Age: 63
End: 2023-07-21
Payer: COMMERCIAL

## 2023-07-21 DIAGNOSIS — L23.89 ALLERGIC CONTACT DERMATITIS DUE TO OTHER AGENTS: ICD-10-CM

## 2023-07-21 DIAGNOSIS — L30.9 HAND DERMATITIS: Primary | ICD-10-CM

## 2023-07-21 DIAGNOSIS — L30.1 DYSHIDROTIC HAND DERMATITIS: ICD-10-CM

## 2023-07-21 PROCEDURE — 99214 OFFICE O/P EST MOD 30 MIN: CPT | Performed by: DERMATOLOGY

## 2023-07-21 RX ORDER — MOMETASONE FUROATE 1 MG/G
OINTMENT TOPICAL
Qty: 15 G | Refills: 1 | Status: SHIPPED | OUTPATIENT
Start: 2023-07-24

## 2023-07-21 RX ORDER — TACROLIMUS 1 MG/G
OINTMENT TOPICAL
Qty: 60 G | Refills: 3 | Status: SHIPPED | OUTPATIENT
Start: 2023-07-21

## 2023-07-21 NOTE — LETTER
7/21/2023         RE: Vicky Carlson  [address] [place] [address]        Dear Colleague,    Thank you for referring your patient, Vicky Carlson, to the [facility] [place]. Please see a copy of my visit note below.    [facility] Dermato-allergology Note  Office visit  Encounter Date: Jul 21, 2023  ____________________________________________    CC: No chief complaint on file.      HPI:  (Jul 21, 2023)  Ms. Vicky Carlson is a(n) 62 year old female who presents today as a return patient for allergy consultation  - Follow-up in Derm-Allergy clinic for 2nd readings and final conclusions after 4 days   - otherwise feeling well in usual state of health    Physical exam:  General: In no acute distress, well-developed, well-nourished  Eyes: no conjunctivitis  ENT: no signs of rhinitis   Pulmonary: no wheezing or coughing  Skin:Focused examination of the skin on test sites was performed = see test results below    Earlier History and Allergy exams:  (Jul 19, 2023)  - Follow-up in Derm-Allergy clinic for 1st readings of patch tests after 2 days      Earlier History and Allergy exams:  (Jul 17, 2023)  - Follow-up in Derm-Allergy clinic for allergy tests as planned  - Patient has mass in the lung and it seems to shrink ==> maybe infectious    Earlier History and Allergy exams:  (Jun 13, 2023)  - Had in 2014 allergy testing with Dr. Peck and found pollen allergies then.   - since 2017 on back of hands first and then also palmar recurrent dermatitis left hand >> right hand  - one time had eczema below the left eye, otherwise other body areas not affected  >> Betamethasone ointment seems to make it worse  >> Elidel helped for some time      Past Medical History:   There is no problem list on file for this patient.    No past medical history on file.    Allergies:  Allergies   Allergen Reactions     Cats Other (See Comments)     Throat gets itchy/swelling       Seasonal Allergies Other (See Comments)     Matted swollen eyes.      Shellfish Allergy Other (See Comments) and Itching     Raw/fresh shrimp-oysters have caused eye swelling and throat itching.  Inconsistent.     Sulfa Antibiotics Unknown     Omeprazole Rash       Medications:  Current Outpatient Medications   Medication     albuterol (VENTOLIN HFA) 108 (90 Base) MCG/ACT inhaler     cholecalciferol 125 MCG (5000 UT) CAPS     Cyanocobalamin 5000 MCG TBDP     fish oil-omega-3 fatty acids (OMEGA-3 FISH OIL) 1000 MG capsule     fluticasone (FLONASE) 50 MCG/ACT nasal spray     levothyroxine (SYNTHROID/LEVOTHROID) 125 MCG tablet     multivitamin w/minerals (THERA-VIT-M) tablet     pimecrolimus (ELIDEL) 1 % external cream     sodium chloride (OCEAN) 0.65 % nasal spray     vitamin C (ASCORBIC ACID) 1000 MG TABS     No current facility-administered medications for this visit.       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 Patient has the following hobbies or non-occupational exposure: golfing, paper crafts  Doesn't wear gloves really    Family History:  No family history on file.    Previous Labs, Allergy Tests, Dermatopathology, Imaging:  none    Referred By: Alex Peck MD  [address] [place] [address]     Allergy Tests:    Past Allergy Test    Order for Future Allergy Testing:    [] Outpatient  [] Inpatient: [facility]..../ Bed ....       Skin Atopy (atopic dermatitis) [] Yes   [x] No ..as child on feet dermatitis  Contact allergies:   [] Yes   [x] No ..........  Hand eczema:   [x] Yes   [] No recurrent dermatitis on back of hands and palmar left>right           Leading hand:   [x] R   [] L       [] Ambidextrous         Drug allergies:        [] Yes   [x] No  which?......    Urticaria/Angioedema  [] Yes   [] No .........  Food Allergy:  [x] Yes   [] No  Which?.if eats a lot of almonds get itchy in the mouth and soarness, shellfish like crab got stuffed in nose and mouth  (shrimp/oyster no problems)   Pets :  [] Yes   [x] No  Which?..reacts to dogs and cats.         [x]  Rhinitis   [x] Conjunctivitis   [x] Sinusitis   [] Polyposis   [] Otitis   [] Pharyngitis         []  Postnasal drip    []  none  Operations:   [] Tonsils   [] Septum   [] Sinus   [] Polyposis        [] Asthma bronchiale   [] Coughing      [x]  none  Symptoms (mostly Rhinoconjunctivitis and Asthma) aggravated by:  Season   [] I   [] II   [x] III   [x] IV   []V   []VI   []VII   [x]VIII   [x]IX   []X   []XI   []XII     [] perennial   Day time      [] morning   [] noon      [] evening        [] night    [] whole day........  []  none  Location/changes    [] inside        [] outside   [] mountains    [] sea     [] others.............   []  none  Triggers, specific     [] animals     [] plants     [] dust              [] others ...........................    []  none  Triggers, others       [] work          [] psyche    [] sport            [] others .............................  []  none  Irritant                [] phys efforts [] smoke    [] heat/cold     [] odors  []others............... []  none    Order for PATCH TESTS  Reason for tests (suspected allergy): recurrent hand dermatitis  Known previous allergies: none  Standardized panels  [x] Standard panel (40 tests)  [x] Preservatives & Antimicrobials (31 tests)  [x] Emulsifiers & Additives (25 tests)   [x] Perfumes/Flavours & Plants (25 tests)  [x] Hairdresser panel (12 tests)  [] Rubber Chemicals (22 tests)  [x] Plastics (26 tests)  [] Colorants/Dyes/Food additives (20 tests)  [] Metals (implants/dental) (24 tests)  [] Local anaesthetics/NSAIDs (13 tests)  [x] Antibiotics & Antimycotics (14 tests)   [x] Corticosteroids (15 tests)   [] Photopatch test (62 tests)   [] others: ...      [x] Patient's own products: Betamethasone    DO NOT test if chemical or biological identity is unknown!     always ask from patient the product information and safety sheets (MSDS)        Order for PRICK TESTS    Reason for tests (suspected allergy): not necessary  Known previous allergies: seasonal RC tested with Dr. Peck    Standardized prick panels  [x] Atopic panel (20 tests) maybe only first 8 for pricks and if neg do IDT with delayed readings  [] Pediatric Panel (12 tests)  [] Milk, Meat, Eggs, Grains (20 tests)   [] Dust, Epithelia, Feathers (10 tests)  [x] Fish, Seafood, Shellfish (17 tests)  [] Nuts, Beans (14 tests)  [] Spice, Vegetable, Fruit (17 tests)  [] Pollen Panel = Tree, Grass, Weed (24 tests)  [] Others: ...      [] Patient's own products: ...    DO NOT test if chemical or biological identity is unknown!     always ask from patient the product information and safety sheets (MSDS)     Standardized intradermal tests  [] Penicillium notatum [] Aspergillus fumigatus [] House dust mites D.far & D. pteron  [] Cat    [] dog  [] Others: ...  [] Bee venom   [] Wasp venom  !!Specific protocol with dilutions!!       Order for Drug allergy tests (prick & Intradermal & patch tests)    [] Penicillin G  [] Ampicillin [] Cefazolin   [] Ceftriaxone   [] Ceftazidime  [x] Bactrim    [] Others: ...  Order for ... as test date    ________________________________    RESULTS & EVALUATION of PATCH TESTS    Jul 17, 2023 application of patch tests:    Patch test readings after     [x] 2 days, [] 3 days [x] 4 days, [] 5 days,  Other duration: ...      STANDARD Series                                          # Substance 2 days 4 days remarks     1 Cesar Mix [C] - -       2 Colophony - -       3  2-Mercaptobenzothiazole  - -       4 Methylisothiazolinone +/++ ++/+++       5 Carba Mix - -       6 Thiuram Mix [A] - -       7 Bisphenol A Epoxy Resin - -       8 Z-Bzub-Wsehnstihqm-Formaldehyde Resin - -       9 Mercapto Mix [A] - -       10 Black Rubber Mix- PPD [B] - -       11 Potassium Dichromate  -  -       12 Balsam of [place] (Myroxylon Pereirae Resin) - +       13 Nickel Sulphate Hexahydrate - -        14 Mixed Dialkyl Thiourea - -       15 Paraben Mix [B] - -       16 Methyldibromo Glutaronitrile + +/++       17 Fragrance Mix 8% + (+)       18 2-Bromo-2-Nitropropane-1,3-Diol (Bronopol) CT - -       19 Lyral - -       20 Tixocortol-21- Pivalate CT - -       21 Diazolidinyl urea (Germall II) - -        22 Methyl Methacrylate - -       23 Cobalt (II) Chloride Hexahydrate - -       24 Fragrance Mix II  - -       25 Compositae Mix - -       26 Benzoyl Peroxide - -       27 Bacitracin - -       28 Formaldehyde - -       29 Methylchloroisothiazolinone / Methylisothiazolinone - -       30 Corticosteroid Mix CT - -       31 Sodium Lauryl Sulfate - -       32 Lanolin Alcohol - -       33 Turpentine - -       34 Cetylstearylalcohol - -       35 Chlorhexidine Dicluconate - -       36 Budesonide - -       37 Imidazolidinyl Urea  - -       38 Ethyl-2 Cyanoacrylate - -       39 Quaternium 15 (Dowicil 200) - -       40 Decyl Glucoside - -       PRESERVATIVES & ANTIMICROBIALS        # Substance 2 days 4 days remarks   41 1  1,2-Benzisothiazoline-3-One, Sodium Salt - -     2  1,3,5-Ryder (2-Hydroxyethyl) - Hexahydrotriazine (Grotan BK) - -     3 4-Cldbqknkvmgkc-0-Nitro-1, 3-Propanediol - -     4  3, 4, 4' - Triclocarban - -    45 5 4 - Chloro - 3 - Cresol - -     6 4 - Chloro - 4 - Xylenol (PCMX) - -     7 7-Ethylbicyclooxazolidine (Bioban PJ2901) - -     8 Benzalkonium Chloride CT - -     9 Benzyl Alcohol - -    50 10 Cetalkonium Chloride - -     11 Cetylpyrimidine Chloride  - -     12 Chloroacetamide - -     13 DMDM Hydantoin - -     14 Glutaraldehyde - -    55 15 Triclosan - -     16 Glyoxal Trimeric Dihydrate - -     17 Iodopropynyl Butylcarbamate - -     18 Octylisothiazoline - -     19 Bithionol CT - -    60 20 Bioban P 1487 (Nitrobutyl) Morpholine/(Ethylnitro-Trimethylene) Dimorpholine - -     21 Phenoxyethanol - -     22 Phenyl Salicylate - -     23 Povidone Iodine - +/++     24 Sodium Benzoate - -    65 25 Sodium Disulfite  - -     26 Sorbic Acid - -     27 Thimerosal - -     28 Melamine Formaldehyde Resin - -     29 Ethylenediamine Dihydrochloride - -      Parabens      70 30 Butyl-P-Hydroxybenzoate - -     31 Ethyl-P-Hydroxybenzoate - -     32 Methyl-P-Hydroxybenzoate - -    73 33 Propyl-P-Hydroxybenzoate - -      EMULSIFIERS & ADDITIVES       # Substance 2 days 4 days remarks   74 1 Polyethylene Glycol-400 - -    75 2 Cocamidopropyl Betaine - -     3 Amerchol L101 - -     4 Propylene Glycol - -     5 Triethanolamine - -     6 Sorbitane Sesquiolate CT - -    80 7 Isopropylmyristate - -     8 Polysorbate 80 CT - -     9 Amidoamine   (Stearamidopropyl Dimethylamine) - -     10 Oleamidopropyl Dimethylamine - -     11 Lauryl Glucoside - -    85 12 Coconut Diethanolamide  - -     13 2-Hydroxy-4-Methoxy Benzophenone (Oxybenzone) - -     14 Benzophenone-4 (Sulisobenzon) - -     15 Propolis - -     16 Dexpanthenol - -    90 17 Abitol - -     18 Tert-Butylhydroquinone - -     19 Benzyl Salicylate - -     20 Dimethylaminopropylamin (DMPA) - -     21 Zinc Pyrithione (Zinc Omadine)  - -    95 22 Ryder(Hydroxymethyl) Nitromethane  - -      Antioxidant       23 Dodecyl Gallate - -     24 Butylhydroxyanisole (BHA) - -     25 Butylhydroxytoluene (BHT) - -     26 Di-Alpha-Tocopherol (Vit E) - -    100 27 Propyl Gallate - -      PERFUMES, FLAVORS & PLANTS        # Substance 2 days 4 days remarks   101 1 Benzyl Cinnamate - -     2 Di-Limonene (Dipentene) - -     3 Cananga Odorata (Ylang Ylang) (I) - -     4 Lichen Acid Mix - -    105 5 Mentha Piperita Oil (Peppermint Oil) - -     6 Sesquiterpenelactone mix - -     7 Tea Tree Oil, Oxidized - -     8 Wood Tar Mix (+) -     9 Abietic Acid - -    110 10 Lavendula Angustifolia Oil (Lavender Oil) - -     11 Fragrance mix II CT * 14% - ?      Fragrance Mix I       12 Oakmoss Absolute - -     13 Eugenol - -     14 Geraniol - -    115 15 Hydroxycitronellal - -     16 Isoeugenol - -     17 Cinnamic Aldehyde - -      18 Cinnamic Alcohol  - -      Fragrance mix II       19 Citronellol - -    120 20 Alpha-Hexylcinnamic Aldehyde    - -     21 Citral - -     22 Farnesol - -    123 23 Coumarin - -      Hexylcinnamic aldehyde, Coumarin, Farnesol, Hydroxyisohexy3-cyclohexene carboxaldehyde, citral, citrolellol  HAIRDRESSER        # Substance 2 days 4 days remarks   124 1 P-Phenylenediamine -  -    125 2 P-Toluenediamine Sulphate  -  -     3 Ammonium Thioglycolate - -     4 Ammonium Persulfate - -     5 Resorcinol - -     6 3-Aminophenol - -    130 7 P-Aminophenol - -     8 Glyceryl Monothioglycolate - -    132 9 Pyrogallol - -      PLASTICS (Acrylates/Epoxy Systems)       # Substance 2 days 4 days remarks     Acrylates - -    133 1 2-Hydroxyethyl Methacrylate (HEMA) - -    134 2 1,4-Butandioldimethacrylate (BUDMA) - -     3  2-Ethylhexyl Acrylate - -     4 Bisphenol-A-Dimethacrylate  - -     5 Diurethane-Dimethacrylate - -     6 Ethyleneglycoldimethacrylate (EGDMA) - -     7 Pentaerythritoltriacrylate (AYE) - -    140 8 Triethylene Glycol Dimethacrylate (TEGDMA) - -      Synthetic material/additives        9 L-Snez-Mfnukcxvgwg - -     10 Tricresyl Phosphate - -     11 3-Epfq-Ulyteucmzioqe - -     12 Dioctyl phtalate(DEHP,DOP) / (Dimethylhexylphthalate)  - -    145 13 Dibutylphthalate - -     14 Dimethylphthalate - -     15 Toluene-2,4-Diisocyanate - -     16 Diphenylmethane-4,4''-Diisocyanate - -      EPOXY RESIN SYSTEMS        Reactive Solvents - -     17 Cresyl Glycidyl Ether - -    150 18 Butyl Glycidyl Ether - -     19 Phenyl Glycidyl Ether - -     20 1,4-Butanediol Diglycidyl Ether - -     21 1,6-Hexanediole Diglycidyl Ether - -      Hardener / Accelerator - -     22 Triethylenetetramine - -    155 23 Diethylenetriamine - -     24 Isophorone Diamine (IPD) - -     25 N,N-Dimethyl-P-Toluidine - -    158 26 Isobornyl Acrylate - -      ANTIBIOTICS & ANTIMYCOTICS    # Substance 2 days 4 days remarks   159 1 Erythromycin - -    160  2 Framycetin Sulfate - -     3 Fusidic Acid Sodium Salt - -     4 Gentamicin Sulfate - -     5 Neomycin Sulfate - -     6 Oxytetracycline  - -    165 7 Polymyxin B Sulfate - -     8 Tetracycline-HCL - -     9 Sulfanilamide - -     10 Metronidazole - -     11 Nitrofurazone - -    170 12 Nystatin - -     13 Clotrimazole - -     14 Clioquinol 5% - -     15 Miconazole  - -    174 16 Tobramycine  - +        CORTICOSTEROIDS   # Substance 2 days 4 days remarks Allergy  class   175 1 Amcinonide - -  B    2 Betametasone-17,21 Dipropionate - -  D1    3 Desoximetasone - -  C    4 Betamethasone-17-Valerate - -  D1    5 Dexamethasone - -  C   180 6 Hydrocortisone - -  A    7 Clobetasol-17-Propionate - -  D1    8 Dexamethasone-21-Phosphate Disodium Salt - -  C    9 Hydrocortisone-17 Butyrate - -  D2    10 Prednisolone - -  A   185 11 Triamcinolone Acetonide - -  B    12 Methylprednisolone Aceponate - -  D2    13 Hydrocortisone-21-Acetate - -  A   188 14 Prednicarbate - -  D2     Group Characteristics of group Generic name Name  cross reactions   A Hydrocortisone   Cloprednole, Fludrocortisone acétate, Hydrocortison acetate, Methylprednisolone, Prednisolone, Tixocortolpivalate Alfacortone, Fucidin H, Dermacalm, Hexacortone, Premandole, Imacort With group D2   B Triamcinolone-acetonide   Budenoside (R-isomer), Amcinonide, Desonide, Fluocinolone acetonide, Triamcinolone acetonide Locapred, Locatop  Synalar, Pevisone, Kenacort -   C Betamethasone (Without Nieves)   Betamethasone, Dexamethasone, Flumethasone pivalate, Halomethasone Daivobet, Dexasalyl, Locasalen,   -   D1 Betamethasone-diproprionate   Betamethasone dipropionate, Betamethasone-17-valerate, Clobetasole-propionate, Fluticasone propionate, Mometasone furoate Betnovate, Diprogenta, Diprosalic, Diprosone, Celestoderm, Fucicort,  Cutivate, Axotide, Elocom -   D2 Methylprednisolone-aceponate   Hydrocortisone-aceponate, Hydrocortisone-buteprate, Hydrocortisone-17-butyrate,  Methylprednisolone aceponate, Prednicarbate Locoïd, Advantan,  Prednitop With group A and Budesonide (S-isomer)       OTHER PRODUCTS        # Substance Conc  % solv 2 days 4 days remarks   189 1 betamethasone As is       190 2 Levothyroxin As is       191 3 Levothyroxin 50 vas            Results of patch tests:                         Interpretation:  - Negative                    A    = Allergic      (+) Erythema    TI   = Toxic/irritant   + E + Infiltration    RaP = Relevance at Present     ++ E/I + Papulovesicle   Rpr  = Relevance Previously     +++ E/I/P + Blister     nR   = No Relevance      Atopy Screen (Placed Jul 17, 2023)  No Substance Readings (15 min) Evaluation   POS Histamine 1mg/ml -    NEG NaCl 0.9% -      No Substance Readings (15 min) Evaluation   1 Alternaria alternata (tenuis)  -    2 Cladosporium herbarum -    3 Aspergillus fumigatus -    4 Penicillium notatum -    5 Dermatophagoides pteronyssinus -    6 Dermatophagoides farinae -    7 Dog epithelium (canis spp) -    8 Cat hair (jose catus) -    Conclusion      Intradermal Testing (Placed Jul 17, 2023)  No Substance Conc.  Reading (15min)  immediate Papule [mm] / Erythema [mm] Reading   (... days)  delayed Papule [mm] / Erythema [mm] Remarks   DF Standard Dust Mite - D. Farinae 1:10 -   -    DP Standard Dust Mite - D. Pteronyssinus 1:10 -   -    A Aspergillus fumigatus  1:10 (+) 8/5  -    P Penicillium notatum 1:10 ? 5/5  -     Alternaria alt 1:10 -   -      1:10 -   -    Conclusions: no signs for relevant sensitization in IDT. Will observe if any delayed reaction      Fish and Seafood (Placed Jul 19, 2023)??  No Substance Readings (15min) Evaluation   POS Histamine 1mg/ml -    NEG NaCl 0.9% -      No Substance Readings (15min) Evaluation   1 Codfish (gadus morhua) -    2 Flounder (platichthys spp) -    3 Tuna (thunnus albecarus) -    4 Bass (centropristis striata) -    5 Mackerel (scomberomorus cavalla) -    6 Halibut (hipoglossus hipoglossus)  -    7 Suwannee (salmo kamar) -    8 Catfish (ictalurus spp) -    9 Perch (serranus scriba) -    10 [place], Sy (oncorhynchus mykiss) -    11 Crab (callinectes spp) -    12 Lobster (homarus americanus) -    13 Shrimp white/brown/pink (farfantepenaeus&litopenaeus) -    14 Kingcrab (paralithodes camtschatica) -    15 Scallop (placopecten magellanicus) -    16 Clam (mercenaria mercenaria) -    17 Oyster (cstrea/crassostrea) -      Conclusion:    DRUG ALLERGY TEST SERIES Jul 17, 2023)    ANTIBIOTICS    Prick Tests         Substance/ Allergen Conc Result (20 min) Remarks    Bactrim 80/400 mg/ml        Intradermal Tests   immed immed delay delay      Substance Conc 1st dil  2nd dil  2 days  4 days remarks    Bactrim 1:100            Patch Tests  as is as is 1:2 1:2     As Is  Vas Substance Conc 2 days 4 days 2 days 4 days remarks   192 193 Bactrim 80/400 mg/ml -  -         [] No relevant allergic reaction observed    [x] Allergic reaction diagnosed against following allergens:    Preservatives: ++/+++ Methylisothiazlinone, +/++ Methyldibromo Glutaronitrile  Flavor/fragrance: + Balsam of peru  Disinfectants/antibiotics: +/++ PVP Iodine, + Tobramycine      Interpretation/ remarks:   Patient is certainly atopic with hyperirritable, dry skin particularly at work as a . However, there is a very relevant sensitization to common preservatives (Chlormethylisothiazolinone > Methyldibromo Glutaronitrile) and less to fragrances (balsam of [place]). In addition, some reactions to the disinfectant PVP Iodine (e.g. Betadine) and topical antibiotic Tobramycine.  No signs for specific reaction to the sulfonamide Bactrim and the Levothyroxine.    [x] Patient information given   [x] ACDS CAMP information's (# [id number], and [id number]) to following  compounds: Methylisothiazlinone,  Methyldibromo Glutaronitrile, Balsam of peru, PVP  Iodine, Tobramycine   [] General information's to following compounds: ......      Assessment &  Plan:      ==> Final Diagnosis:     # recurrent dermatitis on hand left>right  >> proven sensitization to common preservatives (MCI and Methyldibromo Glutaronitrile)  > atopic with dyshidrosis?  > no signs for tinea manuum in fungal culture  * chronic illness with exacerbation, progression, side effects from treatment    # atopic predisposition with    Seasonal RC (proven by Dr. Peck)    Dyshidrotic hand dermatitis  * chronic illness with exacerbation, progression, side effects from treatment      No signs in skin tests (prick, intradermal and patch) for specific drug allergy to the Sulfonamide Bactrim  ==> if patient needs these medications, they can be used. Keep patient 30min under observation in clinic for first, initial dose.     These conclusions are made at the best of one's knowledge and belief based on the provided evidence such as patient's history and allergy test results and they can change over time or can be incomplete because of missing information's.    ==> Treatment Plan:    >> follow the recommendations of the CAMP and use only topical products at work and private from the [facility]    >> on the hands next to regular moisturizing use 2xdaily Protopic ointment (instead of Elidel) from Monday to Friday and Weekends steroids (e.g. Triamcinolone or Mometasone)  ___________________________    Staff: : provider    Follow-up in Derm-Allergy clinic if necessary  ___________________________    I spent a total of 35 minutes with Vicky Carlson during today s  visit. This time was spent discussing all the individual test results, correlating them to the clinical relevance, counseling the patient and/or coordinating care. Moreover time was spent to install and explain the CAMP [facility] from [employer] with the informations on the individual allergens and propositions of products that can be used. Please see Assessment and Plan for additional details.            Again, thank you for allowing  me to participate in the care of your patient.        Sincerely,        Jeff Wild MD

## 2023-07-21 NOTE — PATIENT INSTRUCTIONS
Allergic reaction diagnosed against following allergens:    Preservatives: ++/+++ Methylisothiazlinone, +/++ Methyldibromo Glutaronitrile  Flavor/fragrance: + Balsam of peru  Disinfectants/antibiotics: +/++ PVP Iodine, + Tobramycine      Interpretation/ remarks:   Patient is certainly atopic with hyperirritable, dry skin particularly at work as a . However, there is a very relevant sensitization to common preservatives (Chlormethylisothiazolinone > Methyldibromo Glutaronitrile) and less to fragrances (balsam of [place]). In addition, some reactions to the disinfectant PVP Iodine (e.g. Betadine) and topical antibiotic Tobramycine.  No signs for specific reaction to the sulfonamide Bactrim and the Levothyroxine.    [x] Patient information given   [x] ACDS CAMP information's (# [id number], and [id number]) to following  compounds: Methylisothiazlinone,  Methyldibromo Glutaronitrile, Balsam of peru, PVP  Iodine, Tobramycine   [] General information's to following compounds: ......      Assessment & Plan:      ==> Final Diagnosis:     # recurrent dermatitis on hand left>right  >> proven sensitization to common preservatives (MCI and Methyldibromo Glutaronitrile)  > atopic with dyshidrosis?  > no signs for tinea manuum in fungal culture  * chronic illness with exacerbation, progression, side effects from treatment    # atopic predisposition with  Seasonal RC (proven by Dr. Peck)  Dyshidrotic hand dermatitis  * chronic illness with exacerbation, progression, side effects from treatment      No signs in skin tests (prick, intradermal and patch) for specific drug allergy to the Sulfonamide Bactrim  ==> if patient needs these medications, they can be used. Keep patient 30min under observation in clinic for first, initial dose.     These conclusions are made at the best of one's knowledge and belief based on the provided evidence such as patient's history and allergy test results and they can change over time or can  be incomplete because of missing information's.    ==> Treatment Plan:    >> follow the recommendations of the CAMP and use only topical products at work and private from the [facility]ra    >> on the hands next to regular moisturizing use 2xdaily Protopic ointment (instead of Elidel) from Monday to Friday and Weekends steroids (e.g. Triamcinolone or Mometasone)    Follow-up in Derm-Allergy clinic if necessary  ___________________________

## 2023-07-21 NOTE — NURSING NOTE
Chief Complaint   Patient presents with     Allergy Testing Followup     Patch day 5     Shawanda FRANKLIN, RN-BSN  Dermatology Surgery  [phone]

## 2023-08-13 ENCOUNTER — HEALTH MAINTENANCE LETTER (OUTPATIENT)
Age: 63
End: 2023-08-13

## 2023-08-15 ENCOUNTER — TELEPHONE (OUTPATIENT)
Dept: DERMATOLOGY | Facility: CLINIC | Age: 63
End: 2023-08-15
Payer: COMMERCIAL

## 2023-08-15 NOTE — TELEPHONE ENCOUNTER
[facility] Call Center    Phone Message    May a detailed message be left on voicemail: yes     Reason for Call: Pt calling to speak with a nurse. See for hand dermatitis by Junito on 7/21. Condition worsening. Please call [phone]. Thanks!     Action Taken: Other: ALLERGY    Travel Screening: Not Applicable

## 2023-08-17 NOTE — TELEPHONE ENCOUNTER
Spoke to pt. Hands seem to be worse when using protopic (tacrolimus). Pt tried stopping it for a few days, seems to be better, but then gets worse. Pt has not changed out products that are approved in the [employer]. Pt was instructed to search the [place] [employer] for only approved products to use, including the medication ointments/creams.  Sent pt the [place] [employer] codes again and instructions how to download to phone or search desktop version. Message sent to Dr. Wild for review.

## 2023-08-22 NOTE — TELEPHONE ENCOUNTER
[employer] message sent to pt to schedule follow up with Dr. Wild or any provider in dermatology

## 2023-12-31 ENCOUNTER — HEALTH MAINTENANCE LETTER (OUTPATIENT)
Age: 63
End: 2023-12-31

## 2025-01-19 ENCOUNTER — HEALTH MAINTENANCE LETTER (OUTPATIENT)
Age: 65
End: 2025-01-19

## 2025-08-31 ENCOUNTER — HEALTH MAINTENANCE LETTER (OUTPATIENT)
Age: 65
End: 2025-08-31

## (undated) RX ORDER — SULFAMETHOXAZOLE AND TRIMETHOPRIM 80; 16 MG/ML; MG/ML
INJECTION INTRAVENOUS
Status: DISPENSED
Start: 2023-07-17